# Patient Record
Sex: MALE | Race: WHITE | NOT HISPANIC OR LATINO | Employment: FULL TIME | URBAN - METROPOLITAN AREA
[De-identification: names, ages, dates, MRNs, and addresses within clinical notes are randomized per-mention and may not be internally consistent; named-entity substitution may affect disease eponyms.]

---

## 2017-05-04 PROBLEM — R60.0 BILATERAL EDEMA OF LOWER EXTREMITY: Status: ACTIVE | Noted: 2017-05-04

## 2018-02-07 PROBLEM — R60.0 BILATERAL EDEMA OF LOWER EXTREMITY: Status: RESOLVED | Noted: 2017-05-04 | Resolved: 2018-02-07

## 2018-07-31 PROBLEM — R73.9 HEMOGLOBIN A1C BETWEEN 7.0% AND 9.0%: Status: ACTIVE | Noted: 2018-07-31

## 2018-07-31 PROBLEM — F41.9 ANXIETY AND DEPRESSION: Status: ACTIVE | Noted: 2018-07-31

## 2018-07-31 PROBLEM — F32.A ANXIETY AND DEPRESSION: Status: ACTIVE | Noted: 2018-07-31

## 2019-07-08 ENCOUNTER — HOSPITAL ENCOUNTER (OUTPATIENT)
Dept: RADIOLOGY | Facility: HOSPITAL | Age: 59
Discharge: HOME | End: 2019-07-08
Attending: INTERNAL MEDICINE
Payer: COMMERCIAL

## 2019-07-08 DIAGNOSIS — M25.562 ACUTE PAIN OF LEFT KNEE: ICD-10-CM

## 2019-07-08 DIAGNOSIS — M79.605 LEFT LEG PAIN: ICD-10-CM

## 2019-07-08 DIAGNOSIS — E78.5 HYPERLIPIDEMIA WITH TARGET LOW DENSITY LIPOPROTEIN (LDL) CHOLESTEROL LESS THAN 70 MG/DL: ICD-10-CM

## 2019-07-08 DIAGNOSIS — I10 ESSENTIAL HYPERTENSION: ICD-10-CM

## 2019-07-08 DIAGNOSIS — E10.65 TYPE 1 DIABETES MELLITUS WITH HYPERGLYCEMIA: ICD-10-CM

## 2019-07-08 DIAGNOSIS — R60.0 LOCALIZED EDEMA: ICD-10-CM

## 2019-07-08 DIAGNOSIS — Z79.899 ENCOUNTER FOR LONG-TERM (CURRENT) USE OF MEDICATIONS: ICD-10-CM

## 2019-07-08 DIAGNOSIS — M79.10 MYALGIA: ICD-10-CM

## 2019-07-08 PROCEDURE — 93971 EXTREMITY STUDY: CPT | Mod: 26,LT,, | Performed by: RADIOLOGY

## 2019-07-08 PROCEDURE — 93971 US LOWER EXTREMITY VEINS LEFT: ICD-10-PCS | Mod: 26,LT,, | Performed by: RADIOLOGY

## 2019-07-08 PROCEDURE — 93971 EXTREMITY STUDY: CPT | Mod: TC,PN,LT

## 2019-12-06 ENCOUNTER — LAB VISIT (OUTPATIENT)
Dept: LAB | Facility: HOSPITAL | Age: 59
End: 2019-12-06
Attending: NURSE PRACTITIONER
Payer: COMMERCIAL

## 2019-12-06 ENCOUNTER — HOSPITAL ENCOUNTER (EMERGENCY)
Facility: HOSPITAL | Age: 59
Discharge: HOME | End: 2019-12-06
Attending: EMERGENCY MEDICINE
Payer: COMMERCIAL

## 2019-12-06 VITALS
OXYGEN SATURATION: 94 % | TEMPERATURE: 98 F | WEIGHT: 250 LBS | BODY MASS INDEX: 33.13 KG/M2 | HEART RATE: 86 BPM | HEIGHT: 73 IN | DIASTOLIC BLOOD PRESSURE: 66 MMHG | SYSTOLIC BLOOD PRESSURE: 116 MMHG | RESPIRATION RATE: 20 BRPM

## 2019-12-06 DIAGNOSIS — R07.1 PAINFUL BREATHING: ICD-10-CM

## 2019-12-06 DIAGNOSIS — R50.9 FEVER, UNSPECIFIED FEVER CAUSE: ICD-10-CM

## 2019-12-06 DIAGNOSIS — R06.02 SOB (SHORTNESS OF BREATH): ICD-10-CM

## 2019-12-06 DIAGNOSIS — R06.00 DYSPNEA: ICD-10-CM

## 2019-12-06 DIAGNOSIS — J06.9 UPPER RESPIRATORY TRACT INFECTION, UNSPECIFIED TYPE: ICD-10-CM

## 2019-12-06 LAB
ALBUMIN SERPL BCP-MCNC: 4.2 G/DL (ref 3.5–5.2)
ALP SERPL-CCNC: 81 U/L (ref 55–135)
ALT SERPL W/O P-5'-P-CCNC: 25 U/L (ref 10–44)
ANION GAP SERPL CALC-SCNC: 14 MMOL/L (ref 8–16)
APTT BLDCRRT: 23.8 SEC (ref 21–32)
AST SERPL-CCNC: 14 U/L (ref 10–40)
BASOPHILS # BLD AUTO: 0.06 K/UL (ref 0–0.2)
BASOPHILS NFR BLD: 0.5 % (ref 0–1.9)
BILIRUB SERPL-MCNC: 0.8 MG/DL (ref 0.1–1)
BNP SERPL-MCNC: 16 PG/ML (ref 0–99)
BUN SERPL-MCNC: 17 MG/DL (ref 6–20)
CALCIUM SERPL-MCNC: 8.8 MG/DL (ref 8.7–10.5)
CHLORIDE SERPL-SCNC: 96 MMOL/L (ref 95–110)
CO2 SERPL-SCNC: 20 MMOL/L (ref 23–29)
CREAT SERPL-MCNC: 1.3 MG/DL (ref 0.5–1.4)
D DIMER PPP FEU-MCNC: <100 NG/ML (ref 100–400)
DIFFERENTIAL METHOD: ABNORMAL
EOSINOPHIL # BLD AUTO: 0.1 K/UL (ref 0–0.5)
EOSINOPHIL NFR BLD: 1 % (ref 0–8)
ERYTHROCYTE [DISTWIDTH] IN BLOOD BY AUTOMATED COUNT: 12.1 % (ref 11.5–14.5)
EST. GFR  (AFRICAN AMERICAN): >60 ML/MIN/1.73 M^2
EST. GFR  (NON AFRICAN AMERICAN): >60 ML/MIN/1.73 M^2
GLUCOSE SERPL-MCNC: 384 MG/DL (ref 70–110)
HCT VFR BLD AUTO: 46.2 % (ref 40–54)
HGB BLD-MCNC: 15.8 G/DL (ref 14–18)
IMM GRANULOCYTES # BLD AUTO: 0.06 K/UL (ref 0–0.04)
IMM GRANULOCYTES NFR BLD AUTO: 0.5 % (ref 0–0.5)
INFLUENZA A, MOLECULAR: NEGATIVE
INFLUENZA B, MOLECULAR: NEGATIVE
INR PPP: 1.1 (ref 0.8–1.2)
LYMPHOCYTES # BLD AUTO: 2.8 K/UL (ref 1–4.8)
LYMPHOCYTES NFR BLD: 22.1 % (ref 18–48)
MCH RBC QN AUTO: 30.7 PG (ref 27–31)
MCHC RBC AUTO-ENTMCNC: 34.2 G/DL (ref 32–36)
MCV RBC AUTO: 90 FL (ref 82–98)
MONOCYTES # BLD AUTO: 0.8 K/UL (ref 0.3–1)
MONOCYTES NFR BLD: 6 % (ref 4–15)
NEUTROPHILS # BLD AUTO: 8.8 K/UL (ref 1.8–7.7)
NEUTROPHILS NFR BLD: 69.9 % (ref 38–73)
NRBC BLD-RTO: 0 /100 WBC
PLATELET # BLD AUTO: 280 K/UL (ref 150–350)
PMV BLD AUTO: 10 FL (ref 9.2–12.9)
POTASSIUM SERPL-SCNC: 3.9 MMOL/L (ref 3.5–5.1)
PROT SERPL-MCNC: 7.4 G/DL (ref 6–8.4)
PROTHROMBIN TIME: 11.4 SEC (ref 9–12.5)
RBC # BLD AUTO: 5.15 M/UL (ref 4.6–6.2)
SODIUM SERPL-SCNC: 130 MMOL/L (ref 136–145)
SPECIMEN SOURCE: NORMAL
TROPONIN I SERPL DL<=0.01 NG/ML-MCNC: <0.01 NG/ML (ref 0.02–0.5)
WBC # BLD AUTO: 12.58 K/UL (ref 3.9–12.7)

## 2019-12-06 PROCEDURE — 80053 COMPREHEN METABOLIC PANEL: CPT

## 2019-12-06 PROCEDURE — 36415 COLL VENOUS BLD VENIPUNCTURE: CPT

## 2019-12-06 PROCEDURE — 71045 X-RAY EXAM CHEST 1 VIEW: CPT | Mod: 26,,, | Performed by: RADIOLOGY

## 2019-12-06 PROCEDURE — 87502 INFLUENZA DNA AMP PROBE: CPT

## 2019-12-06 PROCEDURE — 85610 PROTHROMBIN TIME: CPT

## 2019-12-06 PROCEDURE — 99285 EMERGENCY DEPT VISIT HI MDM: CPT | Mod: 25

## 2019-12-06 PROCEDURE — 85730 THROMBOPLASTIN TIME PARTIAL: CPT

## 2019-12-06 PROCEDURE — 93005 ELECTROCARDIOGRAM TRACING: CPT

## 2019-12-06 PROCEDURE — 83880 ASSAY OF NATRIURETIC PEPTIDE: CPT

## 2019-12-06 PROCEDURE — 71045 X-RAY EXAM CHEST 1 VIEW: CPT | Mod: TC,FY

## 2019-12-06 PROCEDURE — 85379 FIBRIN DEGRADATION QUANT: CPT

## 2019-12-06 PROCEDURE — 85025 COMPLETE CBC W/AUTO DIFF WBC: CPT

## 2019-12-06 PROCEDURE — 84484 ASSAY OF TROPONIN QUANT: CPT

## 2019-12-06 PROCEDURE — 71045 XR CHEST AP PORTABLE: ICD-10-PCS | Mod: 26,,, | Performed by: RADIOLOGY

## 2019-12-06 RX ORDER — PREDNISONE 20 MG/1
20 TABLET ORAL DAILY
Qty: 5 TABLET | Refills: 0 | Status: SHIPPED | OUTPATIENT
Start: 2019-12-06 | End: 2019-12-11

## 2019-12-06 NOTE — ED TRIAGE NOTES
Patient complaining of cough and congestion x2 weeks, [place], given Z-pack, steroid ingection and Rocephin injection. Went to his MD today who did labs and has a CT chest scheduled for 1430 today, told him to come to the ER.

## 2019-12-06 NOTE — ED NOTES
The patient is walked to radiology department. I talked to radiology tech and let them know that the patient has an 18 gauge IV for CT scan.

## 2019-12-09 NOTE — ED PROVIDER NOTES
Encounter Date: 12/6/2019       History     Chief Complaint   Patient presents with    Cough     Patient complaining of cough and congestion x2 weeks, seen at urgent care twice out of state, given Z-pack, steroid ingection and Rocephin injection. Went to his MD today who did labs and has a CT chest scheduled for 1430 today, told him to come to the ER.    Nasal Congestion     58yo male with pmh chronic pain, DM, HTN, and HLD presents to ED for evaluation of intermittent, non-productive cough, pleurisy, and nasal congestion over the last two weeks. He was seen at local pcp with labs and outpatient CT ordered for 1420 today; however, he felt worse and was advised to come to the ED. Denies fever, chills.     Additionally, he was seen at urgent care twice out of state, given Z-pack, steroid ingection and Rocephin injection.         Review of patient's allergies indicates:   Allergen Reactions    Demerol [meperidine] Nausea And Vomiting     Past Medical History:   Diagnosis Date    Chronic pain     DM (diabetes mellitus)     HTN (hypertension)     Hyperlipemia      Past Surgical History:   Procedure Laterality Date    c-spine  2013    CERVICAL SPINE SURGERY  2013    COLONOSCOPY  2005    [place]    LUMBAR SPINE SURGERY  2012    SPINE SURGERY      Cervical     Family History   Problem Relation Age of Onset    Diabetes Father     Cancer Mother         colon    Heart failure Mother      Social History     Tobacco Use    Smoking status: Former Smoker     Packs/day: 1.00     Types: Cigarettes   Substance Use Topics    Alcohol use: No    Drug use: No     Review of Systems   Constitutional: Negative for appetite change, chills, diaphoresis, fatigue and fever.   HENT: Positive for congestion. Negative for ear pain, rhinorrhea, sinus pressure, sinus pain, sore throat and tinnitus.    Eyes: Negative for photophobia and visual disturbance.   Respiratory: Positive for cough and shortness of breath. Negative for  chest tightness and wheezing.    Cardiovascular: Positive for chest pain. Negative for palpitations and leg swelling.   Gastrointestinal: Negative for abdominal pain, constipation, diarrhea, nausea and vomiting.   Endocrine: Negative for cold intolerance, heat intolerance, polydipsia, polyphagia and polyuria.   Genitourinary: Negative for decreased urine volume, difficulty urinating, dysuria, flank pain, frequency, hematuria and urgency.   Musculoskeletal: Negative for arthralgias, back pain, gait problem, joint swelling, myalgias, neck pain and neck stiffness.   Skin: Negative for color change, pallor, rash and wound.   Allergic/Immunologic: Negative for immunocompromised state.   Neurological: Negative for dizziness, syncope, weakness, light-headedness, numbness and headaches.   Hematological: Negative for adenopathy. Does not bruise/bleed easily.   Psychiatric/Behavioral: Negative for decreased concentration, dysphoric mood and sleep disturbance. The patient is not nervous/anxious.    All other systems reviewed and are negative.      Physical Exam     Initial Vitals [12/06/19 1226]   BP Pulse Resp Temp SpO2   109/74 97 20 97.8 °F (36.6 °C) 100 %      MAP       --         Physical Exam    Nursing note and vitals reviewed.  Constitutional: He appears well-developed and well-nourished. He is not diaphoretic. No distress.   HENT:   Head: Normocephalic and atraumatic.   Right Ear: External ear normal.   Left Ear: External ear normal.   Nose: Nose normal.   Mouth/Throat: Oropharynx is clear and moist.   Eyes: Conjunctivae are normal. Pupils are equal, round, and reactive to light. No scleral icterus.   Neck: Normal range of motion. Neck supple. No JVD present.   Cardiovascular: Normal rate, regular rhythm, normal heart sounds and intact distal pulses.   Pulmonary/Chest: Breath sounds normal. No respiratory distress. He has no wheezes. He has no rhonchi. He has no rales. He exhibits no tenderness.   Abdominal: Soft.  Bowel sounds are normal. He exhibits no distension. There is no tenderness. There is no rebound and no guarding.   Musculoskeletal: Normal range of motion. He exhibits no edema or tenderness.   Lymphadenopathy:     He has no cervical adenopathy.   Neurological: He is alert and oriented to person, place, and time. GCS score is 15. GCS eye subscore is 4. GCS verbal subscore is 5. GCS motor subscore is 6.   Skin: Skin is warm and dry. Capillary refill takes less than 2 seconds. No rash and no abscess noted. No erythema. No pallor.   Psychiatric: He has a normal mood and affect. His behavior is normal. Judgment and thought content normal.         ED Course   Procedures  Labs Reviewed   TROPONIN I - Abnormal; Notable for the following components:       Result Value    Troponin I <0.01 (*)     All other components within normal limits   INFLUENZA A & B BY MOLECULAR   APTT   PROTIME-INR   D DIMER, QUANTITATIVE   B-TYPE NATRIURETIC PEPTIDE     EKG Readings: (Independently Interpreted)   Initial Reading: No STEMI. Rhythm: Normal Sinus Rhythm. Heart Rate: 87. Ectopy: No Ectopy. Conduction: Normal. ST Segments: Normal ST Segments. T Waves: Normal. Axis: Normal. Clinical Impression: Normal Sinus Rhythm     ECG Results          EKG 12-lead (Final result)  Result time 12/07/19 09:10:49    Final result by [facility] (12/07/19 09:10:49)                 Narrative:    Test Reason : R05    Vent. Rate : 087 BPM     Atrial Rate : 087 BPM     P-R Int : 166 ms          QRS Dur : 088 ms      QT Int : 350 ms       P-R-T Axes : 061 025 064 degrees     QTc Int : 421 ms    Normal sinus rhythm  Normal ECG  No previous ECGs available  Confirmed by Quincy Damon MD ([id number]) on 12/7/2019 9:10:38 AM    Referred By: [email or web address]   SELF           Confirmed By:Quincy Damon MD                            Imaging Results          X-Ray Chest AP Portable (Final result)  Result time 12/06/19 13:14:26    Final result by Jose Guadalupe Arguello  MD (12/06/19 13:14:26)                 Impression:      No acute abnormality.      Electronically signed by: Jose Guadalupe Jos  Date:    12/06/2019  Time:    13:14             Narrative:    EXAMINATION:  XR CHEST AP PORTABLE    CLINICAL HISTORY:  dyspnea;.    TECHNIQUE:  Single frontal portable view of the chest was performed.    COMPARISON:  09/30/2017.    FINDINGS:  Support devices: None    The lungs are clear, with normal appearance of pulmonary vasculature and no pleural effusion or pneumothorax.    The cardiac silhouette is normal in size. The hilar and mediastinal contours are unremarkable.    Bones are intact.  Prior ACDF of the lower cervical spine.                              X-Rays:   Independently Interpreted Readings:   Chest X-Ray: Normal heart size.  No infiltrates.  No acute abnormalities.     Medical Decision Making:   Differential Diagnosis:   Pneumonia, acute bronchitis, pleurisy, acute myocardial infarction, acute congestive heart failure, electrolyte imbalance, influenza, acute pulmonary embolus  ED Management:  Discussed all results with the patient, who is comfortable returning home.   He has been advised of negative d-dimer value but instructed to follow up with pcp regarding previously ordered CTPE study.   Advised of return precautions, instructed to follow up closely.                                 Clinical Impression:       ICD-10-CM ICD-9-CM   1. Dyspnea R06.00 786.09         Disposition:   Disposition: Discharged  Condition: Stable                     Shari Darnell MD  12/09/19 0926

## 2020-01-29 ENCOUNTER — HOSPITAL ENCOUNTER (OUTPATIENT)
Dept: RADIOLOGY | Facility: HOSPITAL | Age: 60
Discharge: HOME | End: 2020-01-29
Attending: NURSE PRACTITIONER
Payer: COMMERCIAL

## 2020-01-29 DIAGNOSIS — I73.9 CLAUDICATION: ICD-10-CM

## 2020-01-29 DIAGNOSIS — G62.9 NEUROPATHY: ICD-10-CM

## 2020-01-29 PROCEDURE — 93925 US ARTERIAL LOWER EXTREMITY BILAT WITH ABI (XPD): ICD-10-PCS | Mod: 26,,, | Performed by: RADIOLOGY

## 2020-01-29 PROCEDURE — 93922 UPR/L XTREMITY ART 2 LEVELS: CPT | Mod: 26,,, | Performed by: RADIOLOGY

## 2020-01-29 PROCEDURE — 93922 US ARTERIAL LOWER EXTREMITY BILAT WITH ABI (XPD): ICD-10-PCS | Mod: 26,,, | Performed by: RADIOLOGY

## 2020-01-29 PROCEDURE — 93925 LOWER EXTREMITY STUDY: CPT | Mod: 26,,, | Performed by: RADIOLOGY

## 2020-01-29 PROCEDURE — 93922 UPR/L XTREMITY ART 2 LEVELS: CPT | Mod: TC

## 2020-02-17 ENCOUNTER — HOSPITAL ENCOUNTER (OUTPATIENT)
Facility: HOSPITAL | Age: 60
Discharge: HOME | End: 2020-02-19
Attending: EMERGENCY MEDICINE | Admitting: INTERNAL MEDICINE
Payer: COMMERCIAL

## 2020-02-17 DIAGNOSIS — F32.A ANXIETY AND DEPRESSION: ICD-10-CM

## 2020-02-17 DIAGNOSIS — E78.5 HYPERLIPIDEMIA WITH TARGET LOW DENSITY LIPOPROTEIN (LDL) CHOLESTEROL LESS THAN 70 MG/DL: ICD-10-CM

## 2020-02-17 DIAGNOSIS — R73.9 HEMOGLOBIN A1C BETWEEN 7.0% AND 9.0%: ICD-10-CM

## 2020-02-17 DIAGNOSIS — F41.9 ANXIETY AND DEPRESSION: ICD-10-CM

## 2020-02-17 DIAGNOSIS — Z79.899 ENCOUNTER FOR LONG-TERM (CURRENT) USE OF MEDICATIONS: ICD-10-CM

## 2020-02-17 DIAGNOSIS — R07.9 CHEST PAIN: ICD-10-CM

## 2020-02-17 DIAGNOSIS — I20.0 UNSTABLE ANGINA: Primary | ICD-10-CM

## 2020-02-17 DIAGNOSIS — E10.65 TYPE 1 DIABETES MELLITUS WITH HYPERGLYCEMIA: ICD-10-CM

## 2020-02-17 LAB
ALBUMIN SERPL BCP-MCNC: 4.1 G/DL (ref 3.5–5.2)
ALP SERPL-CCNC: 64 U/L (ref 55–135)
ALT SERPL W/O P-5'-P-CCNC: 21 U/L (ref 10–44)
ANION GAP SERPL CALC-SCNC: 11 MMOL/L (ref 8–16)
AST SERPL-CCNC: 14 U/L (ref 10–40)
BASOPHILS # BLD AUTO: 0.07 K/UL (ref 0–0.2)
BASOPHILS NFR BLD: 0.6 % (ref 0–1.9)
BILIRUB SERPL-MCNC: 0.7 MG/DL (ref 0.1–1)
BILIRUB UR QL STRIP: NEGATIVE
BNP SERPL-MCNC: 17 PG/ML (ref 0–99)
BUN SERPL-MCNC: 12 MG/DL (ref 6–20)
CALCIUM SERPL-MCNC: 8.8 MG/DL (ref 8.7–10.5)
CHLORIDE SERPL-SCNC: 100 MMOL/L (ref 95–110)
CLARITY UR: CLEAR
CO2 SERPL-SCNC: 24 MMOL/L (ref 23–29)
COLOR UR: YELLOW
CREAT SERPL-MCNC: 1.1 MG/DL (ref 0.5–1.4)
DIFFERENTIAL METHOD: ABNORMAL
EOSINOPHIL # BLD AUTO: 0.3 K/UL (ref 0–0.5)
EOSINOPHIL NFR BLD: 2.1 % (ref 0–8)
ERYTHROCYTE [DISTWIDTH] IN BLOOD BY AUTOMATED COUNT: 12.4 % (ref 11.5–14.5)
EST. GFR  (AFRICAN AMERICAN): >60 ML/MIN/1.73 M^2
EST. GFR  (NON AFRICAN AMERICAN): >60 ML/MIN/1.73 M^2
ESTIMATED AVG GLUCOSE: 200 MG/DL (ref 68–131)
GLUCOSE SERPL-MCNC: 216 MG/DL (ref 70–110)
GLUCOSE UR QL STRIP: ABNORMAL
HBA1C MFR BLD HPLC: 8.6 % (ref 4.5–6.2)
HCT VFR BLD AUTO: 43.8 % (ref 40–54)
HGB BLD-MCNC: 15.1 G/DL (ref 14–18)
HGB UR QL STRIP: NEGATIVE
IMM GRANULOCYTES # BLD AUTO: 0.04 K/UL (ref 0–0.04)
IMM GRANULOCYTES NFR BLD AUTO: 0.3 % (ref 0–0.5)
INFLUENZA A, MOLECULAR: NEGATIVE
INFLUENZA B, MOLECULAR: NEGATIVE
KETONES UR QL STRIP: NEGATIVE
LEUKOCYTE ESTERASE UR QL STRIP: NEGATIVE
LYMPHOCYTES # BLD AUTO: 3.3 K/UL (ref 1–4.8)
LYMPHOCYTES NFR BLD: 26.8 % (ref 18–48)
MCH RBC QN AUTO: 31.1 PG (ref 27–31)
MCHC RBC AUTO-ENTMCNC: 34.5 G/DL (ref 32–36)
MCV RBC AUTO: 90 FL (ref 82–98)
MONOCYTES # BLD AUTO: 0.9 K/UL (ref 0.3–1)
MONOCYTES NFR BLD: 7.1 % (ref 4–15)
NEUTROPHILS # BLD AUTO: 7.7 K/UL (ref 1.8–7.7)
NEUTROPHILS NFR BLD: 63.1 % (ref 38–73)
NITRITE UR QL STRIP: NEGATIVE
NRBC BLD-RTO: 0 /100 WBC
PH UR STRIP: 6 [PH] (ref 5–8)
PLATELET # BLD AUTO: 280 K/UL (ref 150–350)
PMV BLD AUTO: 9.9 FL (ref 9.2–12.9)
POCT GLUCOSE: 103 MG/DL (ref 70–110)
POCT GLUCOSE: 205 MG/DL (ref 70–110)
POCT GLUCOSE: 45 MG/DL (ref 70–110)
POCT GLUCOSE: 52 MG/DL (ref 70–110)
POTASSIUM SERPL-SCNC: 3.1 MMOL/L (ref 3.5–5.1)
PROT SERPL-MCNC: 7.4 G/DL (ref 6–8.4)
PROT UR QL STRIP: NEGATIVE
RBC # BLD AUTO: 4.85 M/UL (ref 4.6–6.2)
SODIUM SERPL-SCNC: 135 MMOL/L (ref 136–145)
SP GR UR STRIP: <=1.005 (ref 1–1.03)
SPECIMEN SOURCE: NORMAL
TROPONIN I SERPL DL<=0.01 NG/ML-MCNC: 0.01 NG/ML (ref 0.02–0.5)
TROPONIN I SERPL DL<=0.01 NG/ML-MCNC: <0.01 NG/ML (ref 0.02–0.5)
URN SPEC COLLECT METH UR: ABNORMAL
UROBILINOGEN UR STRIP-ACNC: NEGATIVE EU/DL
WBC # BLD AUTO: 12.22 K/UL (ref 3.9–12.7)

## 2020-02-17 PROCEDURE — 71046 XR CHEST PA AND LATERAL: ICD-10-PCS | Mod: 26,,, | Performed by: RADIOLOGY

## 2020-02-17 PROCEDURE — 82962 GLUCOSE BLOOD TEST: CPT

## 2020-02-17 PROCEDURE — 85025 COMPLETE CBC W/AUTO DIFF WBC: CPT

## 2020-02-17 PROCEDURE — 83880 ASSAY OF NATRIURETIC PEPTIDE: CPT

## 2020-02-17 PROCEDURE — 25000003 PHARM REV CODE 250: Performed by: EMERGENCY MEDICINE

## 2020-02-17 PROCEDURE — 71046 X-RAY EXAM CHEST 2 VIEWS: CPT | Mod: 26,,, | Performed by: RADIOLOGY

## 2020-02-17 PROCEDURE — G0378 HOSPITAL OBSERVATION PER HR: HCPCS

## 2020-02-17 PROCEDURE — 80053 COMPREHEN METABOLIC PANEL: CPT

## 2020-02-17 PROCEDURE — 36415 COLL VENOUS BLD VENIPUNCTURE: CPT

## 2020-02-17 PROCEDURE — 87502 INFLUENZA DNA AMP PROBE: CPT

## 2020-02-17 PROCEDURE — 99285 EMERGENCY DEPT VISIT HI MDM: CPT | Mod: 25

## 2020-02-17 PROCEDURE — 71046 X-RAY EXAM CHEST 2 VIEWS: CPT | Mod: TC,FY

## 2020-02-17 PROCEDURE — 81003 URINALYSIS AUTO W/O SCOPE: CPT

## 2020-02-17 PROCEDURE — 84484 ASSAY OF TROPONIN QUANT: CPT | Mod: 91

## 2020-02-17 PROCEDURE — 83036 HEMOGLOBIN GLYCOSYLATED A1C: CPT

## 2020-02-17 RX ORDER — SODIUM CHLORIDE 0.9 % (FLUSH) 0.9 %
10 SYRINGE (ML) INJECTION
Status: DISCONTINUED | OUTPATIENT
Start: 2020-02-17 | End: 2020-02-19 | Stop reason: HOSPADM

## 2020-02-17 RX ORDER — ONDANSETRON 2 MG/ML
4 INJECTION INTRAMUSCULAR; INTRAVENOUS EVERY 8 HOURS PRN
Status: DISCONTINUED | OUTPATIENT
Start: 2020-02-17 | End: 2020-02-19 | Stop reason: HOSPADM

## 2020-02-17 RX ORDER — HYDROCODONE BITARTRATE AND ACETAMINOPHEN 5; 325 MG/1; MG/1
1 TABLET ORAL EVERY 4 HOURS PRN
Status: DISCONTINUED | OUTPATIENT
Start: 2020-02-17 | End: 2020-02-19 | Stop reason: HOSPADM

## 2020-02-17 RX ORDER — ASPIRIN 325 MG
325 TABLET ORAL
Status: COMPLETED | OUTPATIENT
Start: 2020-02-17 | End: 2020-02-17

## 2020-02-17 RX ADMIN — ASPIRIN 325 MG ORAL TABLET 325 MG: 325 PILL ORAL at 07:02

## 2020-02-17 RX ADMIN — HYDROCODONE BITARTRATE AND ACETAMINOPHEN 1 TABLET: 5; 325 TABLET ORAL at 09:02

## 2020-02-17 NOTE — ED TRIAGE NOTES
"amb to er with c/o "generalized weakness" states " I get very tired  Easily. Pt states this has been going on for approx 2 weeks now   "

## 2020-02-17 NOTE — ED PROVIDER NOTES
Encounter Date: 2020       History     Chief Complaint   Patient presents with    general malaise      59-year-old male with past medical history of hypertension, diabetes, obesity comes emergency room with complaints of generalized malaise getting worse over the last week to 2 weeks.  The patient reports that with any type of walking or exertion because extremely winded, nauseated, pale, and has pain that radiates down his left arm to his left neck to his left cheek.  States that upon resting this gets better however today the pain took markedly longer to resolve.  Patient came to the ER for further evaluation.  Patient did have a heart catheterization in  which was negative at that time.  Mother still alive with extensive cardiovascular heart disease in 2 open heart surgeries.  Father  from old age at age 80.        Review of patient's allergies indicates:   Allergen Reactions    Demerol [meperidine] Nausea And Vomiting     Past Medical History:   Diagnosis Date    Anxiety     Chronic pain     Depression     Diabetes mellitus type I     DM (diabetes mellitus)     HTN (hypertension)     Hyperlipemia     Obesity      Past Surgical History:   Procedure Laterality Date    c-spine  2013    CERVICAL SPINE SURGERY  2013    [place]OSCOPY  2005    [place]    LUMBAR SPINE SURGERY  2012    SPINE SURGERY      Cervical     Family History   Problem Relation Age of Onset    Diabetes Father     Cancer Mother         colon    Heart failure Mother      Social History     Tobacco Use    Smoking status: Current Some Day Smoker     Packs/day: 1.00     Types: Cigarettes    Smokeless tobacco: Never Used   Substance Use Topics    Alcohol use: No    Drug use: No     Review of Systems   Constitutional: Negative.    HENT: Negative.    Respiratory: Positive for shortness of breath.    Cardiovascular: Positive for chest pain.   Gastrointestinal: Negative.    Musculoskeletal:        Left arm pain that hurts  with exertion and resolves with rest.   All other systems reviewed and are negative.      Physical Exam     Initial Vitals [02/17/20 1522]   BP Pulse Resp Temp SpO2   (!) 146/78 92 18 97.4 °F (36.3 °C) 100 %      MAP       --         Physical Exam    Nursing note and vitals reviewed.  Constitutional: He appears well-developed and well-nourished.   HENT:   Head: Normocephalic and atraumatic.   Eyes: EOM are normal. Pupils are equal, round, and reactive to light.   Neck: Normal range of motion.   Cardiovascular: Normal rate, regular rhythm and normal heart sounds.   Pulmonary/Chest: Breath sounds normal.   Abdominal: Soft. Bowel sounds are normal.   Neurological: He is alert and oriented to person, place, and time. He has normal strength. GCS score is 15. GCS eye subscore is 4. GCS verbal subscore is 5. GCS motor subscore is 6.   Skin: Skin is warm. Capillary refill takes less than 2 seconds.   Psychiatric: He has a normal mood and affect. Thought content normal.         ED Course   Procedures  Labs Reviewed   CBC W/ AUTO DIFFERENTIAL - Abnormal; Notable for the following components:       Result Value    Mean Corpuscular Hemoglobin 31.1 (*)     All other components within normal limits   COMPREHENSIVE METABOLIC PANEL - Abnormal; Notable for the following components:    Sodium 135 (*)     Potassium 3.1 (*)     Glucose 216 (*)     All other components within normal limits   TROPONIN I - Abnormal; Notable for the following components:    Troponin I 0.01 (*)     All other components within normal limits   POCT GLUCOSE - Abnormal; Notable for the following components:    POCT Glucose 205 (*)     All other components within normal limits   INFLUENZA A & B BY MOLECULAR     EKG Readings: (Independently Interpreted)   Rate 88, normal sinus rhythm, normal axis, normal QRS, normal R-wave progression, normal EKG.       Imaging Results    None          Medical Decision Making:   Differential Diagnosis:   MI, STEMI, anxiety  reaction, costochondritis, PE, pneumothorax, angina, cancer, fracture    ED Management:  The patient is stable this time.  I discussed with the patient the likelihood that this could be cardiac in origin.  I will admit this patient to hospital.  I spoke with Dr. Brownlee.  He understands and agrees with this plan.  I will also order a stress test with echo in the morning.                                 Clinical Impression:       ICD-10-CM ICD-9-CM   1. Unstable angina I20.0 411.1   2. Chest pain R07.9 786.50         Disposition:   Disposition: Admitted  Condition: Nidia Duarte MD  02/17/20 7538

## 2020-02-18 PROBLEM — R07.89 OTHER CHEST PAIN: Status: ACTIVE | Noted: 2020-02-18

## 2020-02-18 LAB
ALBUMIN SERPL BCP-MCNC: 3.4 G/DL (ref 3.5–5.2)
ALP SERPL-CCNC: 57 U/L (ref 55–135)
ALT SERPL W/O P-5'-P-CCNC: 20 U/L (ref 10–44)
ANION GAP SERPL CALC-SCNC: 6 MMOL/L (ref 8–16)
AST SERPL-CCNC: 14 U/L (ref 10–40)
BASOPHILS # BLD AUTO: 0.06 K/UL (ref 0–0.2)
BASOPHILS NFR BLD: 0.9 % (ref 0–1.9)
BILIRUB SERPL-MCNC: 0.6 MG/DL (ref 0.1–1)
BUN SERPL-MCNC: 10 MG/DL (ref 6–20)
CALCIUM SERPL-MCNC: 8.9 MG/DL (ref 8.7–10.5)
CHLORIDE SERPL-SCNC: 105 MMOL/L (ref 95–110)
CO2 SERPL-SCNC: 28 MMOL/L (ref 23–29)
CREAT SERPL-MCNC: 0.9 MG/DL (ref 0.5–1.4)
DIFFERENTIAL METHOD: NORMAL
EOSINOPHIL # BLD AUTO: 0.2 K/UL (ref 0–0.5)
EOSINOPHIL NFR BLD: 3 % (ref 0–8)
ERYTHROCYTE [DISTWIDTH] IN BLOOD BY AUTOMATED COUNT: 12.6 % (ref 11.5–14.5)
EST. GFR  (AFRICAN AMERICAN): >60 ML/MIN/1.73 M^2
EST. GFR  (NON AFRICAN AMERICAN): >60 ML/MIN/1.73 M^2
GLUCOSE SERPL-MCNC: 229 MG/DL (ref 70–110)
HCT VFR BLD AUTO: 41.7 % (ref 40–54)
HGB BLD-MCNC: 14.4 G/DL (ref 14–18)
IMM GRANULOCYTES # BLD AUTO: 0.02 K/UL (ref 0–0.04)
IMM GRANULOCYTES NFR BLD AUTO: 0.3 % (ref 0–0.5)
LYMPHOCYTES # BLD AUTO: 2.5 K/UL (ref 1–4.8)
LYMPHOCYTES NFR BLD: 35.3 % (ref 18–48)
MCH RBC QN AUTO: 30.8 PG (ref 27–31)
MCHC RBC AUTO-ENTMCNC: 34.5 G/DL (ref 32–36)
MCV RBC AUTO: 89 FL (ref 82–98)
MONOCYTES # BLD AUTO: 0.6 K/UL (ref 0.3–1)
MONOCYTES NFR BLD: 8 % (ref 4–15)
NEUTROPHILS # BLD AUTO: 3.7 K/UL (ref 1.8–7.7)
NEUTROPHILS NFR BLD: 52.5 % (ref 38–73)
NRBC BLD-RTO: 0 /100 WBC
PLATELET # BLD AUTO: 256 K/UL (ref 150–350)
PMV BLD AUTO: 10 FL (ref 9.2–12.9)
POCT GLUCOSE: 116 MG/DL (ref 70–110)
POCT GLUCOSE: 215 MG/DL (ref 70–110)
POCT GLUCOSE: 243 MG/DL (ref 70–110)
POCT GLUCOSE: 264 MG/DL (ref 70–110)
POCT GLUCOSE: 73 MG/DL (ref 70–110)
POTASSIUM SERPL-SCNC: 3.7 MMOL/L (ref 3.5–5.1)
PROT SERPL-MCNC: 6.5 G/DL (ref 6–8.4)
RBC # BLD AUTO: 4.68 M/UL (ref 4.6–6.2)
SODIUM SERPL-SCNC: 139 MMOL/L (ref 136–145)
TROPONIN I SERPL DL<=0.01 NG/ML-MCNC: <0.01 NG/ML (ref 0.02–0.5)
WBC # BLD AUTO: 7 K/UL (ref 3.9–12.7)

## 2020-02-18 PROCEDURE — 96372 THER/PROPH/DIAG INJ SC/IM: CPT | Performed by: EMERGENCY MEDICINE

## 2020-02-18 PROCEDURE — 80053 COMPREHEN METABOLIC PANEL: CPT

## 2020-02-18 PROCEDURE — G0378 HOSPITAL OBSERVATION PER HR: HCPCS

## 2020-02-18 PROCEDURE — 25000003 PHARM REV CODE 250

## 2020-02-18 PROCEDURE — 63600175 PHARM REV CODE 636 W HCPCS: Performed by: INTERNAL MEDICINE

## 2020-02-18 PROCEDURE — 85025 COMPLETE CBC W/AUTO DIFF WBC: CPT

## 2020-02-18 PROCEDURE — 25000003 PHARM REV CODE 250: Performed by: INTERNAL MEDICINE

## 2020-02-18 PROCEDURE — 25000003 PHARM REV CODE 250: Performed by: EMERGENCY MEDICINE

## 2020-02-18 PROCEDURE — C9399 UNCLASSIFIED DRUGS OR BIOLOG: HCPCS | Performed by: INTERNAL MEDICINE

## 2020-02-18 PROCEDURE — 84484 ASSAY OF TROPONIN QUANT: CPT

## 2020-02-18 PROCEDURE — 63600175 PHARM REV CODE 636 W HCPCS

## 2020-02-18 RX ORDER — ASPIRIN 325 MG
TABLET ORAL
Status: COMPLETED
Start: 2020-02-18 | End: 2020-02-18

## 2020-02-18 RX ORDER — DULOXETIN HYDROCHLORIDE 30 MG/1
60 CAPSULE, DELAYED RELEASE ORAL DAILY
Status: DISCONTINUED | OUTPATIENT
Start: 2020-02-18 | End: 2020-02-19 | Stop reason: HOSPADM

## 2020-02-18 RX ORDER — ATORVASTATIN CALCIUM 10 MG/1
20 TABLET, FILM COATED ORAL DAILY
Status: DISCONTINUED | OUTPATIENT
Start: 2020-02-18 | End: 2020-02-19 | Stop reason: HOSPADM

## 2020-02-18 RX ORDER — PREGABALIN 100 MG/1
100 CAPSULE ORAL 3 TIMES DAILY
Status: DISCONTINUED | OUTPATIENT
Start: 2020-02-18 | End: 2020-02-19 | Stop reason: HOSPADM

## 2020-02-18 RX ORDER — INSULIN ASPART 100 [IU]/ML
INJECTION, SOLUTION INTRAVENOUS; SUBCUTANEOUS
Status: COMPLETED
Start: 2020-02-18 | End: 2020-02-18

## 2020-02-18 RX ORDER — ASPIRIN 325 MG
325 TABLET ORAL DAILY
Status: DISCONTINUED | OUTPATIENT
Start: 2020-02-18 | End: 2020-02-19 | Stop reason: HOSPADM

## 2020-02-18 RX ORDER — PANTOPRAZOLE SODIUM 40 MG/1
40 TABLET, DELAYED RELEASE ORAL DAILY
Status: DISCONTINUED | OUTPATIENT
Start: 2020-02-18 | End: 2020-02-19 | Stop reason: HOSPADM

## 2020-02-18 RX ORDER — ASPIRIN 325 MG
325 TABLET, DELAYED RELEASE (ENTERIC COATED) ORAL DAILY
Status: DISCONTINUED | OUTPATIENT
Start: 2020-02-18 | End: 2020-02-18

## 2020-02-18 RX ADMIN — ASPIRIN 325 MG ORAL TABLET 325 MG: 325 PILL ORAL at 12:02

## 2020-02-18 RX ADMIN — HYDROCODONE BITARTRATE AND ACETAMINOPHEN 1 TABLET: 5; 325 TABLET ORAL at 09:02

## 2020-02-18 RX ADMIN — PANTOPRAZOLE SODIUM 40 MG: 40 TABLET, DELAYED RELEASE ORAL at 12:02

## 2020-02-18 RX ADMIN — PREGABALIN 100 MG: 100 CAPSULE ORAL at 02:02

## 2020-02-18 RX ADMIN — INSULIN DETEMIR 30 UNITS: 100 INJECTION, SOLUTION SUBCUTANEOUS at 12:02

## 2020-02-18 RX ADMIN — HYDROCODONE BITARTRATE AND ACETAMINOPHEN 1 TABLET: 5; 325 TABLET ORAL at 10:02

## 2020-02-18 RX ADMIN — ATORVASTATIN CALCIUM 20 MG: 10 TABLET, FILM COATED ORAL at 12:02

## 2020-02-18 RX ADMIN — INSULIN ASPART 5 UNITS: 100 INJECTION, SOLUTION INTRAVENOUS; SUBCUTANEOUS at 09:02

## 2020-02-18 RX ADMIN — NITROGLYCERIN 0.5 INCH: 20 OINTMENT TOPICAL at 05:02

## 2020-02-18 RX ADMIN — HYDROCODONE BITARTRATE AND ACETAMINOPHEN 1 TABLET: 5; 325 TABLET ORAL at 05:02

## 2020-02-18 RX ADMIN — PREGABALIN 100 MG: 100 CAPSULE ORAL at 09:02

## 2020-02-18 RX ADMIN — DULOXETINE 60 MG: 30 CAPSULE, DELAYED RELEASE ORAL at 12:02

## 2020-02-18 NOTE — SUBJECTIVE & OBJECTIVE
"Past Medical History:   Diagnosis Date    Anxiety     Chronic pain     Depression     Diabetes mellitus type I     DM (diabetes mellitus)     HTN (hypertension)     Hyperlipemia     Obesity        Past Surgical History:   Procedure Laterality Date    c-spine  2013    CERVICAL SPINE SURGERY  2013    COLONOSCOPY  2005    [place]    LUMBAR SPINE SURGERY  2012    SPINE SURGERY      Cervical       Review of patient's allergies indicates:   Allergen Reactions    Demerol [meperidine] Nausea And Vomiting       No current facility-administered medications on file prior to encounter.      Current Outpatient Medications on File Prior to Encounter   Medication Sig    aspirin (ECOTRIN) 325 MG EC tablet Take 325 mg by mouth once daily.    albuterol (VENTOLIN HFA) 90 mcg/actuation inhaler Inhale 2 puffs into the lungs every 6 (six) hours as needed for Wheezing. Rescue    albuterol-ipratropium (DUO-NEB) 2.5 mg-0.5 mg/3 mL nebulizer solution Take 3 mLs by nebulization every 4 to 6 hours as needed for Wheezing.    atorvastatin (LIPITOR) 20 MG tablet TAKE 1 TABLET BY MOUTH ONCE DAILY    blood sugar diagnostic (ACCU-CHEK ACTIVE TEST) Strp 1 strip by Misc.(Non-Drug; Combo Route) route 5 (five) times daily.    blood-glucose meter kit Use as instructed    DULoxetine (CYMBALTA) 60 MG capsule Take 1 capsule (60 mg total) by mouth once daily.    insulin glargine U-300 conc (TOUJEO MAX U-300 SOLOSTAR) 300 unit/mL (3 mL) InPn Inject 66 Units into the skin once daily.    insulin lispro (HUMALOG KWIKPEN INSULIN) 100 unit/mL pen Inject 15 Units into the skin 3 (three) times daily with meals. Sliding scale max 30 units daily    insulin needles, disposable, 30 x 1/3 " Ndle 1 each by Misc.(Non-Drug; Combo Route) route as directed.    insulin syringe-needle U-100 (BD INSULIN SYRINGE ULTRA-FINE) 1/2 mL 30 gauge x 1/2" Syrg Inject 1 each into the skin 4 (four) times daily.    lisinopril-hydrochlorothiazide " "(PRINZIDE,ZESTORETIC) 20-12.5 mg per tablet Take 2 tablets by mouth once daily.    omeprazole (PRILOSEC) 40 MG capsule Take 1 capsule (40 mg total) by mouth once daily.    pen needle, diabetic (NOVOFINE PLUS) 32 gauge x 1/6" Ndle Inject 1 each into the skin once daily.    pregabalin (LYRICA) 100 MG capsule Take 1 capsule (100 mg total) by mouth 3 (three) times daily.     Family History     Problem Relation (Age of Onset)    Cancer Mother    Diabetes Father    Heart failure Mother        Tobacco Use    Smoking status: Current Some Day Smoker     Packs/day: 1.00     Types: Cigarettes    Smokeless tobacco: Never Used   Substance and Sexual Activity    Alcohol use: No    Drug use: No    Sexual activity: Yes     Review of Systems   Constitutional: Negative.    HENT: Negative.    Respiratory: Positive for shortness of breath.    Cardiovascular: Positive for chest pain.   All other systems reviewed and are negative.    Objective:     Vital Signs (Most Recent):  Temp: 97.9 °F (36.6 °C) (02/18/20 0800)  Pulse: 70 (02/18/20 0800)  Resp: (!) 7 (02/18/20 0800)  BP: 121/74 (02/18/20 0800)  SpO2: (!) 93 % (02/18/20 0800) Vital Signs (24h Range):  Temp:  [97.4 °F (36.3 °C)-98.3 °F (36.8 °C)] 97.9 °F (36.6 °C)  Pulse:  [70-92] 70  Resp:  [7-18] 7  SpO2:  [92 %-100 %] 93 %  BP: (105-146)/(57-85) 121/74     Weight: 113.8 kg (250 lb 14.1 oz)  Body mass index is 33.1 kg/m².    Physical Exam   Constitutional: He is oriented to person, place, and time. Vital signs are normal. He appears well-developed and well-nourished. He is active.   HENT:   Head: Normocephalic and atraumatic.   Eyes: Pupils are equal, round, and reactive to light. Conjunctivae are normal.   Neck: Normal range of motion. Neck supple. No thyromegaly present.   Cardiovascular: Normal rate, regular rhythm and normal heart sounds.   Pulmonary/Chest: Effort normal and breath sounds normal.   Abdominal: Soft. Normal appearance and bowel sounds are normal. There is " no tenderness.   Musculoskeletal: Normal range of motion.   Neurological: He is alert and oriented to person, place, and time.   Skin: Skin is warm and dry.   Psychiatric: He has a normal mood and affect. His behavior is normal. Judgment and thought content normal.   Nursing note and vitals reviewed.        CRANIAL NERVES     CN III, IV, VI   Pupils are equal, round, and reactive to light.       Significant Labs: All pertinent labs within the past 24 hours have been reviewed.    Significant Imaging: I have reviewed and interpreted all pertinent imaging results/findings within the past 24 hours.

## 2020-02-18 NOTE — H&P
[facility] - ICU Hospital Medicine  History & Physical    Patient Name: Billy Sears  MRN: [id number]  Admission Date: 2/17/2020  Attending Physician: Andrew Brownlee III, MD  Primary Care Provider: Andrew Brownlee III, MD         Patient information was obtained from patient and ER records.     Subjective:     Principal Problem:Other chest pain    Chief Complaint:   Chief Complaint   Patient presents with    general malaise         HPI: 2.17  Admitted with cp ;and varela    Past Medical History:   Diagnosis Date    Anxiety     Chronic pain     Depression     Diabetes mellitus type I     DM (diabetes mellitus)     HTN (hypertension)     Hyperlipemia     Obesity        Past Surgical History:   Procedure Laterality Date    c-spine  2013    CERVICAL SPINE SURGERY  2013    COLONOSCOPY  2005    [place]    LUMBAR SPINE SURGERY  2012    SPINE SURGERY      Cervical       Review of patient's allergies indicates:   Allergen Reactions    Demerol [meperidine] Nausea And Vomiting       No current facility-administered medications on file prior to encounter.      Current Outpatient Medications on File Prior to Encounter   Medication Sig    aspirin (ECOTRIN) 325 MG EC tablet Take 325 mg by mouth once daily.    albuterol (VENTOLIN HFA) 90 mcg/actuation inhaler Inhale 2 puffs into the lungs every 6 (six) hours as needed for Wheezing. Rescue    albuterol-ipratropium (DUO-NEB) 2.5 mg-0.5 mg/3 mL nebulizer solution Take 3 mLs by nebulization every 4 to 6 hours as needed for Wheezing.    atorvastatin (LIPITOR) 20 MG tablet TAKE 1 TABLET BY MOUTH ONCE DAILY    blood sugar diagnostic (ACCU-CHEK ACTIVE TEST) Strp 1 strip by Misc.(Non-Drug; Combo Route) route 5 (five) times daily.    blood-glucose meter kit Use as instructed    DULoxetine (CYMBALTA) 60 MG capsule Take 1 capsule (60 mg total) by mouth once daily.    insulin glargine U-300 conc (TOUJEO MAX U-300 SOLOSTAR) 300 unit/mL (3 mL) InPn  "Inject 66 Units into the skin once daily.    insulin lispro (HUMALOG KWIKPEN INSULIN) 100 unit/mL pen Inject 15 Units into the skin 3 (three) times daily with meals. Sliding scale max 30 units daily    insulin needles, disposable, 30 x 1/3 " Ndle 1 each by Misc.(Non-Drug; Combo Route) route as directed.    insulin syringe-needle U-100 (BD INSULIN SYRINGE ULTRA-FINE) 1/2 mL 30 gauge x 1/2" Syrg Inject 1 each into the skin 4 (four) times daily.    lisinopril-hydrochlorothiazide (PRINZIDE,ZESTORETIC) 20-12.5 mg per tablet Take 2 tablets by mouth once daily.    omeprazole (PRILOSEC) 40 MG capsule Take 1 capsule (40 mg total) by mouth once daily.    pen needle, diabetic (NOVOFINE PLUS) 32 gauge x 1/6" Ndle Inject 1 each into the skin once daily.    pregabalin (LYRICA) 100 MG capsule Take 1 capsule (100 mg total) by mouth 3 (three) times daily.     Family History     Problem Relation (Age of Onset)    Cancer Mother    Diabetes Father    Heart failure Mother        Tobacco Use    Smoking status: Current Some Day Smoker     Packs/day: 1.00     Types: Cigarettes    Smokeless tobacco: Never Used   Substance and Sexual Activity    Alcohol use: No    Drug use: No    Sexual activity: Yes     Review of Systems   Constitutional: Negative.    HENT: Negative.    Respiratory: Positive for shortness of breath.    Cardiovascular: Positive for chest pain.   All other systems reviewed and are negative.    Objective:     Vital Signs (Most Recent):  Temp: 97.9 °F (36.6 °C) (02/18/20 0800)  Pulse: 70 (02/18/20 0800)  Resp: (!) 7 (02/18/20 0800)  BP: 121/74 (02/18/20 0800)  SpO2: (!) 93 % (02/18/20 0800) Vital Signs (24h Range):  Temp:  [97.4 °F (36.3 °C)-98.3 °F (36.8 °C)] 97.9 °F (36.6 °C)  Pulse:  [70-92] 70  Resp:  [7-18] 7  SpO2:  [92 %-100 %] 93 %  BP: (105-146)/(57-85) 121/74     Weight: 113.8 kg (250 lb 14.1 oz)  Body mass index is 33.1 kg/m².    Physical Exam   Constitutional: He is oriented to person, place, and time. " Vital signs are normal. He appears well-developed and well-nourished. He is active.   HENT:   Head: Normocephalic and atraumatic.   Eyes: Pupils are equal, round, and reactive to light. Conjunctivae are normal.   Neck: Normal range of motion. Neck supple. No thyromegaly present.   Cardiovascular: Normal rate, regular rhythm and normal heart sounds.   Pulmonary/Chest: Effort normal and breath sounds normal.   Abdominal: Soft. Normal appearance and bowel sounds are normal. There is no tenderness.   Musculoskeletal: Normal range of motion.   Neurological: He is alert and oriented to person, place, and time.   Skin: Skin is warm and dry.   Psychiatric: He has a normal mood and affect. His behavior is normal. Judgment and thought content normal.   Nursing note and vitals reviewed.        CRANIAL NERVES     CN III, IV, VI   Pupils are equal, round, and reactive to light.       Significant Labs: All pertinent labs within the past 24 hours have been reviewed.    Significant Imaging: I have reviewed and interpreted all pertinent imaging results/findings within the past 24 hours.    Assessment/Plan:     * Other chest pain    2.17  Admit  R/o mi  Stress test      VTE Risk Mitigation (From admission, onward)         Ordered     IP VTE HIGH RISK PATIENT  Once      02/17/20 1847     Place MICHELLE hose  Until discontinued      02/17/20 1847                   Andrew Brownlee III, MD  Department of Hospital Medicine   [facility] - ICU

## 2020-02-18 NOTE — SUBJECTIVE & OBJECTIVE
"Interval History: still c/o chest "tightness"    Review of Systems   Constitutional: Negative.    HENT: Negative.    Cardiovascular: Positive for chest pain.   All other systems reviewed and are negative.    Objective:     Vital Signs (Most Recent):  Temp: 97.9 °F (36.6 °C) (02/18/20 0800)  Pulse: 70 (02/18/20 0800)  Resp: (!) 7 (02/18/20 0800)  BP: 121/74 (02/18/20 0800)  SpO2: (!) 93 % (02/18/20 0800) Vital Signs (24h Range):  Temp:  [97.4 °F (36.3 °C)-98.3 °F (36.8 °C)] 97.9 °F (36.6 °C)  Pulse:  [70-92] 70  Resp:  [7-18] 7  SpO2:  [92 %-100 %] 93 %  BP: (105-146)/(57-85) 121/74     Weight: 113.8 kg (250 lb 14.1 oz)  Body mass index is 33.1 kg/m².    Intake/Output Summary (Last 24 hours) at 2/18/2020 1253  Last data filed at 2/18/2020 0405  Gross per 24 hour   Intake 324 ml   Output 1875 ml   Net -1551 ml      Physical Exam   Constitutional: He is oriented to person, place, and time. Vital signs are normal. He appears well-developed and well-nourished. He is active.   HENT:   Head: Normocephalic and atraumatic.   Eyes: Pupils are equal, round, and reactive to light. Conjunctivae are normal.   Neck: Normal range of motion. Neck supple. No thyromegaly present.   Cardiovascular: Normal rate, regular rhythm and normal heart sounds.   Pulmonary/Chest: Effort normal and breath sounds normal.   Abdominal: Soft. Normal appearance and bowel sounds are normal. There is no tenderness.   Musculoskeletal: Normal range of motion.   Neurological: He is alert and oriented to person, place, and time.   Skin: Skin is warm and dry.   Psychiatric: He has a normal mood and affect. His behavior is normal. Judgment and thought content normal.   Nursing note and vitals reviewed.      Significant Labs:   Recent Lab Results       02/18/20  1211   02/18/20  0430   02/18/20  0115   02/18/20  0053   02/18/20  0007        Influenza A, Molecular               Influenza B, Molecular               Albumin   3.4           Alkaline Phosphatase   " 57           ALT   20           Anion Gap   6           Appearance, UA               AST   14           Baso #   0.06           Basophil%   0.9           Bilirubin (UA)               BILIRUBIN TOTAL   0.6  Comment:  For infants and newborns, interpretation of results should be based  on gestational age, weight and in agreement with clinical  observations.  Premature Infant recommended reference ranges:  Up to 24 hours.............<8.0 mg/dL  Up to 48 hours............<12.0 mg/dL  3-5 days..................<15.0 mg/dL  6-29 days.................<15.0 mg/dL             BNP               BUN, Bld   10           Calcium   8.9           Chloride   105           CO2   28           Color, UA               Creatinine   0.9           Differential Method   Automated           eGFR if    >60.0           eGFR if non    >60.0  Comment:  Calculation used to obtain the estimated glomerular filtration  rate (eGFR) is the CKD-EPI equation.              Eos #   0.2           Eosinophil%   3.0           Estimated Avg Glucose               Flu A & B Source               Glucose   229           Glucose, UA               Gran # (ANC)   3.7           Gran%   52.5           Hematocrit   41.7           Hemoglobin   14.4           Hemoglobin A1C External               Immature Grans (Abs)   0.02  Comment:  Mild elevation in immature granulocytes is non specific and   can be seen in a variety of conditions including stress response,   acute inflammation, trauma and pregnancy. Correlation with other   laboratory and clinical findings is essential.             Immature Granulocytes   0.3           Ketones, UA               Leukocytes, UA               Lymph #   2.5           Lymph%   35.3           MCH   30.8           MCHC   34.5           MCV   89           Mono #   0.6           Mono%   8.0           MPV   10.0           NITRITE UA               nRBC   0           Occult Blood UA               pH, UA                Platelets   256           POCT Glucose 215   116   73     Potassium   3.7           PROTEIN TOTAL   6.5           Protein, UA               RBC   4.68           RDW   12.6           Sodium   139           Specific [place], UA               Specimen UA               Troponin I       <0.01       UROBILINOGEN UA               WBC   7.00                            02/17/20  2350   02/17/20  2335   02/17/20  2119   02/17/20  1904   02/17/20  1631        Influenza A, Molecular               Influenza B, Molecular               Albumin               Alkaline Phosphatase               ALT               Anion Gap               Appearance, UA         Clear     AST               Baso #               Basophil%               Bilirubin (UA)         Negative     BILIRUBIN TOTAL               BNP               BUN, Bld               Calcium               Chloride               CO2               Color, UA         Yellow     Creatinine               Differential Method               eGFR if                eGFR if non                Eos #               Eosinophil%               Estimated Avg Glucose       200       Flu A & B Source               Glucose               Glucose, UA         1+     Gran # (ANC)               Gran%               Hematocrit               Hemoglobin               Hemoglobin A1C External       8.6  Comment:  According to ADA guidelines, hemoglobin A1C <7.0% represents  optimal control in non-pregnant diabetic patients.  Different  metrics may apply to specific populations.   Standards of Medical Care in Diabetes - 2016.  For the purpose of screening for the presence of diabetes:  <5.7%     Consistent with the absence of diabetes  5.7-6.4%  Consistent with increasing risk for diabetes   (prediabetes)  >or=6.5%  Consistent with diabetes  Currently no consensus exists for use of hemoglobin A1C  for diagnosis of diabetes for children.         Immature Grans (Abs)                Immature Granulocytes               Ketones, UA         Negative     Leukocytes, UA         Negative     Lymph #               Lymph%               MCH               MCHC               MCV               Mono #               Mono%               MPV               NITRITE UA         Negative     nRBC               Occult Blood UA         Negative     pH, UA         6.0     Platelets               POCT Glucose 45 52 103         Potassium               PROTEIN TOTAL               Protein, UA         Negative  Comment:  Recommend a 24 hour urine protein or a urine   protein/creatinine ratio if globulin induced proteinuria is  clinically suspected.       RBC               RDW               Sodium               Specific Gravity, UA         <=1.005     Specimen UA         Urine, Clean Catch     Troponin I       <0.01       UROBILINOGEN UA         Negative     WBC                                02/17/20  1541   02/17/20  1524        Influenza A, Molecular Negative       Influenza B, Molecular Negative       Albumin 4.1       Alkaline Phosphatase 64       ALT 21       Anion Gap 11       Appearance, UA         AST 14       Baso # 0.07       Basophil% 0.6       Bilirubin (UA)         BILIRUBIN TOTAL 0.7  Comment:  For infants and newborns, interpretation of results should be based  on gestational age, weight and in agreement with clinical  observations.  Premature Infant recommended reference ranges:  Up to 24 hours.............<8.0 mg/dL  Up to 48 hours............<12.0 mg/dL  3-5 days..................<15.0 mg/dL  6-29 days.................<15.0 mg/dL         BNP 17  Comment:  Values of less than 100 pg/ml are consistent with non-CHF populations.       BUN, Bld 12       Calcium 8.8       Chloride 100       CO2 24       Color, UA         Creatinine 1.1       Differential Method Automated       eGFR if  >60.0       eGFR if non  >60.0  Comment:  Calculation used to obtain the estimated glomerular  filtration  rate (eGFR) is the CKD-EPI equation.          Eos # 0.3       Eosinophil% 2.1       Estimated Avg Glucose         Flu A & B Source Nasal Swab       Glucose 216       Glucose, UA         Gran # (ANC) 7.7       Gran% 63.1       Hematocrit 43.8       Hemoglobin 15.1       Hemoglobin A1C External         Immature Grans (Abs) 0.04  Comment:  Mild elevation in immature granulocytes is non specific and   can be seen in a variety of conditions including stress response,   acute inflammation, trauma and pregnancy. Correlation with other   laboratory and clinical findings is essential.         Immature Granulocytes 0.3       Ketones, UA         Leukocytes, UA         Lymph # 3.3       Lymph% 26.8       MCH 31.1       MCHC 34.5       MCV 90       Mono # 0.9       Mono% 7.1       MPV 9.9       NITRITE UA         nRBC 0       Occult Blood UA         pH, UA         Platelets 280       POCT Glucose   205     Potassium 3.1       PROTEIN TOTAL 7.4       Protein, UA         RBC 4.85       RDW 12.4       Sodium 135       Specific [place], UA         Specimen UA         Troponin I 0.01       UROBILINOGEN UA         WBC 12.22             Significant Imaging: I have reviewed and interpreted all pertinent imaging results/findings within the past 24 hours.

## 2020-02-18 NOTE — NURSING
On assessment, pt. Resting well in bed, with no acute events overnight, no chest pain noted. Vitals wnl, call bell within reach. Will continue to monitor.

## 2020-02-18 NOTE — PROGRESS NOTES
"[facility] - ICU Hospital Medicine  Progress Note    Patient Name: Billy Sears  MRN: [id number]  Patient Class: OP- Observation   Admission Date: 2/17/2020  Length of Stay: 0 days  Attending Physician: Andrew Brownlee III, MD  Primary Care Provider: Andrew Brownlee III, MD        Subjective:     Principal Problem:Other chest pain        HPI:  2.17  Admitted with cp ;and varela    Overview/Hospital Course:  No notes on file    Interval History: still c/o chest "tightness"    Review of Systems   Constitutional: Negative.    HENT: Negative.    Cardiovascular: Positive for chest pain.   All other systems reviewed and are negative.    Objective:     Vital Signs (Most Recent):  Temp: 97.9 °F (36.6 °C) (02/18/20 0800)  Pulse: 70 (02/18/20 0800)  Resp: (!) 7 (02/18/20 0800)  BP: 121/74 (02/18/20 0800)  SpO2: (!) 93 % (02/18/20 0800) Vital Signs (24h Range):  Temp:  [97.4 °F (36.3 °C)-98.3 °F (36.8 °C)] 97.9 °F (36.6 °C)  Pulse:  [70-92] 70  Resp:  [7-18] 7  SpO2:  [92 %-100 %] 93 %  BP: (105-146)/(57-85) 121/74     Weight: 113.8 kg (250 lb 14.1 oz)  Body mass index is 33.1 kg/m².    Intake/Output Summary (Last 24 hours) at 2/18/2020 1253  Last data filed at 2/18/2020 0405  Gross per 24 hour   Intake 324 ml   Output 1875 ml   Net -1551 ml      Physical Exam   Constitutional: He is oriented to person, place, and time. Vital signs are normal. He appears well-developed and well-nourished. He is active.   HENT:   Head: Normocephalic and atraumatic.   Eyes: Pupils are equal, round, and reactive to light. Conjunctivae are normal.   Neck: Normal range of motion. Neck supple. No thyromegaly present.   Cardiovascular: Normal rate, regular rhythm and normal heart sounds.   Pulmonary/Chest: Effort normal and breath sounds normal.   Abdominal: Soft. Normal appearance and bowel sounds are normal. There is no tenderness.   Musculoskeletal: Normal range of motion.   Neurological: He is alert and oriented to person, place, " and time.   Skin: Skin is warm and dry.   Psychiatric: He has a normal mood and affect. His behavior is normal. Judgment and thought content normal.   Nursing note and vitals reviewed.      Significant Labs:   Recent Lab Results       02/18/20  1211   02/18/20  0430   02/18/20  0115   02/18/20  0053   02/18/20  0007        Influenza A, Molecular               Influenza B, Molecular               Albumin   3.4           Alkaline Phosphatase   57           ALT   20           Anion Gap   6           Appearance, UA               AST   14           Baso #   0.06           Basophil%   0.9           Bilirubin (UA)               BILIRUBIN TOTAL   0.6  Comment:  For infants and newborns, interpretation of results should be based  on gestational age, weight and in agreement with clinical  observations.  Premature Infant recommended reference ranges:  Up to 24 hours.............<8.0 mg/dL  Up to 48 hours............<12.0 mg/dL  3-5 days..................<15.0 mg/dL  6-29 days.................<15.0 mg/dL             BNP               BUN, Bld   10           Calcium   8.9           Chloride   105           CO2   28           Color, UA               Creatinine   0.9           Differential Method   Automated           eGFR if    >60.0           eGFR if non    >60.0  Comment:  Calculation used to obtain the estimated glomerular filtration  rate (eGFR) is the CKD-EPI equation.              Eos #   0.2           Eosinophil%   3.0           Estimated Avg Glucose               Flu A & B Source               Glucose   229           Glucose, UA               Gran # (ANC)   3.7           Gran%   52.5           Hematocrit   41.7           Hemoglobin   14.4           Hemoglobin A1C External               Immature Grans (Abs)   0.02  Comment:  Mild elevation in immature granulocytes is non specific and   can be seen in a variety of conditions including stress response,   acute inflammation, trauma and  pregnancy. Correlation with other   laboratory and clinical findings is essential.             Immature Granulocytes   0.3           Ketones, UA               Leukocytes, UA               Lymph #   2.5           Lymph%   35.3           MCH   30.8           MCHC   34.5           MCV   89           Mono #   0.6           Mono%   8.0           MPV   10.0           NITRITE UA               nRBC   0           Occult Blood UA               pH, UA               Platelets   256           POCT Glucose 215   116   73     Potassium   3.7           PROTEIN TOTAL   6.5           Protein, UA               RBC   4.68           RDW   12.6           Sodium   139           Specific [place], UA               Specimen UA               Troponin I       <0.01       UROBILINOGEN UA               WBC   7.00                            02/17/20  2350   02/17/20  2335   02/17/20  2119   02/17/20  1904   02/17/20  1631        Influenza A, Molecular               Influenza B, Molecular               Albumin               Alkaline Phosphatase               ALT               Anion Gap               Appearance, UA         Clear     AST               Baso #               Basophil%               Bilirubin (UA)         Negative     BILIRUBIN TOTAL               BNP               BUN, Bld               Calcium               Chloride               CO2               Color, UA         Yellow     Creatinine               Differential Method               eGFR if                eGFR if non                Eos #               Eosinophil%               Estimated Avg Glucose       200       Flu A & B Source               Glucose               Glucose, UA         1+     Gran # (ANC)               Gran%               Hematocrit               Hemoglobin               Hemoglobin A1C External       8.6  Comment:  According to ADA guidelines, hemoglobin A1C <7.0% represents  optimal control in non-pregnant diabetic patients.   Different  metrics may apply to specific populations.   Standards of Medical Care in Diabetes - 2016.  For the purpose of screening for the presence of diabetes:  <5.7%     Consistent with the absence of diabetes  5.7-6.4%  Consistent with increasing risk for diabetes   (prediabetes)  >or=6.5%  Consistent with diabetes  Currently no consensus exists for use of hemoglobin A1C  for diagnosis of diabetes for children.         Immature Grans (Abs)               Immature Granulocytes               Ketones, UA         Negative     Leukocytes, UA         Negative     Lymph #               Lymph%               MCH               MCHC               MCV               Mono #               Mono%               MPV               NITRITE UA         Negative     nRBC               Occult Blood UA         Negative     pH, UA         6.0     Platelets               POCT Glucose 45 52 103         Potassium               PROTEIN TOTAL               Protein, UA         Negative  Comment:  Recommend a 24 hour urine protein or a urine   protein/creatinine ratio if globulin induced proteinuria is  clinically suspected.       RBC               RDW               Sodium               Specific Gravity, UA         <=1.005     Specimen UA         Urine, Clean Catch     Troponin I       <0.01       UROBILINOGEN UA         Negative     WBC                                02/17/20  1541   02/17/20  1524        Influenza A, Molecular Negative       Influenza B, Molecular Negative       Albumin 4.1       Alkaline Phosphatase 64       ALT 21       Anion Gap 11       Appearance, UA         AST 14       Baso # 0.07       Basophil% 0.6       Bilirubin (UA)         BILIRUBIN TOTAL 0.7  Comment:  For infants and newborns, interpretation of results should be based  on gestational age, weight and in agreement with clinical  observations.  Premature Infant recommended reference ranges:  Up to 24 hours.............<8.0 mg/dL  Up to 48 hours............<12.0  mg/dL  3-5 days..................<15.0 mg/dL  6-29 days.................<15.0 mg/dL         BNP 17  Comment:  Values of less than 100 pg/ml are consistent with non-CHF populations.       BUN, Bld 12       Calcium 8.8       Chloride 100       CO2 24       Color, UA         Creatinine 1.1       Differential Method Automated       eGFR if  >60.0       eGFR if non  >60.0  Comment:  Calculation used to obtain the estimated glomerular filtration  rate (eGFR) is the CKD-EPI equation.          Eos # 0.3       Eosinophil% 2.1       Estimated Avg Glucose         Flu A & B Source Nasal Swab       Glucose 216       Glucose, UA         Gran # (ANC) 7.7       Gran% 63.1       Hematocrit 43.8       Hemoglobin 15.1       Hemoglobin A1C External         Immature Grans (Abs) 0.04  Comment:  Mild elevation in immature granulocytes is non specific and   can be seen in a variety of conditions including stress response,   acute inflammation, trauma and pregnancy. Correlation with other   laboratory and clinical findings is essential.         Immature Granulocytes 0.3       Ketones, UA         Leukocytes, UA         Lymph # 3.3       Lymph% 26.8       MCH 31.1       MCHC 34.5       MCV 90       Mono # 0.9       Mono% 7.1       MPV 9.9       NITRITE UA         nRBC 0       Occult Blood UA         pH, UA         Platelets 280       POCT Glucose   205     Potassium 3.1       PROTEIN TOTAL 7.4       Protein, UA         RBC 4.85       RDW 12.4       Sodium 135       Specific [place], UA         Specimen UA         Troponin I 0.01       UROBILINOGEN UA         WBC 12.22             Significant Imaging: I have reviewed and interpreted all pertinent imaging results/findings within the past 24 hours.      Assessment/Plan:      * Other chest pain    2.17  Admit  R/o mi  Stress test    2.18  Still having chest tightness  Multiple risk factors for cad  Apparently unable to perform stress test today  Will attempt  tomorrow  Cardiology consult      VTE Risk Mitigation (From admission, onward)         Ordered     IP VTE HIGH RISK PATIENT  Once      02/17/20 1847     Place MICHELLE hose  Until discontinued      02/17/20 1847                      Andrwe Brownlee III, MD  Department of Hospital Medicine   [facility]

## 2020-02-18 NOTE — NURSING
Repeat glucose noted to be in the 40s. Food and milk administered. Subsequent repeat glucose in the 70s. Will continue to monitor.

## 2020-02-18 NOTE — PLAN OF CARE
02/18/20 0925   Discharge Assessment   Assessment Type Discharge Planning Assessment   Confirmed/corrected address and phone number on facesheet? Yes   Assessment information obtained from? Patient   Expected Length of Stay (days) 2   Communicated expected length of stay with patient/caregiver yes   Prior to hospitilization cognitive status: Alert/Oriented   Prior to hospitalization functional status: Independent   Current cognitive status: Alert/Oriented   Current Functional Status: Independent   Lives With sibling(s)   Able to Return to Prior Arrangements yes   Is patient able to care for self after discharge? Yes   Who are your caregiver(s) and their phone number(s)? none   Patient's perception of discharge disposition home or selfcare   Readmission Within the Last 30 Days no previous admission in last 30 days   Patient currently being followed by outpatient case management? No   Patient currently receives any other outside agency services? No   Equipment Currently Used at Home nebulizer   Do you have any problems affording any of your prescribed medications? No   Is the patient taking medications as prescribed? yes   Does the patient have transportation home? Yes   Transportation Anticipated car, drives self;family or friend will provide   Does the patient receive services at the [facility]? No   Discharge Plan A Home   DME Needed Upon Discharge  none   Patient/Family in Agreement with Plan yes       Patient resting in bed with hob elevated, alert and oriented.  States he lives in [place], [place] but only uses Dr Brownlee as his physician.  Patient denies use of home health services.  States the only medical equipment at home is a nebulizer.  Denies any needs at this time.  Case Management will continue to follow for further needs.

## 2020-02-18 NOTE — NURSING
Pt requests CBG to be checked. Glucose noted to be in the 50s. Orange juice administered. Recheck in 10-15 minutes.

## 2020-02-18 NOTE — ASSESSMENT & PLAN NOTE
2.17  Admit  R/o mi  Stress test    2.18  Still having chest tightness  Multiple risk factors for cad  Apparently unable to perform stress test today  Will attempt tomorrow  Cardiology consult

## 2020-02-18 NOTE — PLAN OF CARE
Problem: Adult Inpatient Plan of Care  Goal: Plan of Care Review  Outcome: Ongoing, Progressing  Flowsheets (Taken 2/18/2020 0141)  Plan of Care Reviewed With: patient     Problem: Diabetes Comorbidity  Goal: Blood Glucose Level Within Desired Range  Outcome: Ongoing, Progressing  Intervention: Maintain Glycemic Control  Flowsheets (Taken 2/18/2020 0141)  Glycemic Management: blood glucose monitoring; carbohydrate replacement provided

## 2020-02-19 VITALS
HEIGHT: 73 IN | OXYGEN SATURATION: 94 % | RESPIRATION RATE: 12 BRPM | WEIGHT: 250 LBS | HEART RATE: 80 BPM | DIASTOLIC BLOOD PRESSURE: 58 MMHG | SYSTOLIC BLOOD PRESSURE: 119 MMHG | BODY MASS INDEX: 33.13 KG/M2 | TEMPERATURE: 98 F

## 2020-02-19 PROBLEM — E66.09 CLASS 1 OBESITY DUE TO EXCESS CALORIES WITH SERIOUS COMORBIDITY AND BODY MASS INDEX (BMI) OF 33.0 TO 33.9 IN ADULT: Status: ACTIVE | Noted: 2020-02-19

## 2020-02-19 PROBLEM — F17.200 SMOKER: Status: ACTIVE | Noted: 2020-02-19

## 2020-02-19 PROBLEM — E65 ABDOMINAL OBESITY: Status: ACTIVE | Noted: 2020-02-19

## 2020-02-19 PROBLEM — Z82.49 FAMILY HISTORY OF PREMATURE CAD: Status: ACTIVE | Noted: 2020-02-19

## 2020-02-19 PROBLEM — T50.2X5A DIURETIC-INDUCED HYPOKALEMIA: Status: ACTIVE | Noted: 2020-02-19

## 2020-02-19 PROBLEM — Z91.89 CARDIOVASCULAR EVENT RISK: Status: ACTIVE | Noted: 2020-02-19

## 2020-02-19 PROBLEM — E87.6 DIURETIC-INDUCED HYPOKALEMIA: Status: ACTIVE | Noted: 2020-02-19

## 2020-02-19 LAB
ALBUMIN SERPL BCP-MCNC: 3.6 G/DL (ref 3.5–5.2)
ALP SERPL-CCNC: 61 U/L (ref 55–135)
ALT SERPL W/O P-5'-P-CCNC: 23 U/L (ref 10–44)
ANION GAP SERPL CALC-SCNC: 7 MMOL/L (ref 8–16)
AORTIC ROOT ANNULUS: 3.57 CM
AORTIC VALVE CUSP SEPERATION: 2.06 CM
AST SERPL-CCNC: 12 U/L (ref 10–40)
BASOPHILS # BLD AUTO: 0.05 K/UL (ref 0–0.2)
BASOPHILS NFR BLD: 0.8 % (ref 0–1.9)
BILIRUB SERPL-MCNC: 0.4 MG/DL (ref 0.1–1)
BSA FOR ECHO PROCEDURE: 2.42 M2
BUN SERPL-MCNC: 13 MG/DL (ref 6–20)
CALCIUM SERPL-MCNC: 9.1 MG/DL (ref 8.7–10.5)
CHLORIDE SERPL-SCNC: 104 MMOL/L (ref 95–110)
CO2 SERPL-SCNC: 29 MMOL/L (ref 23–29)
CREAT SERPL-MCNC: 1 MG/DL (ref 0.5–1.4)
CV ECHO LV RWT: 0.53 CM
CV STRESS BASE HR: 87 BPM
DIASTOLIC BLOOD PRESSURE: 80 MMHG
DIFFERENTIAL METHOD: NORMAL
DOP CALC LVOT AREA: 4.8 CM2
DOP CALC LVOT DIAMETER: 2.48 CM
ECHO LV POSTERIOR WALL: 1.13 CM (ref 0.6–1.1)
EOSINOPHIL # BLD AUTO: 0.2 K/UL (ref 0–0.5)
EOSINOPHIL NFR BLD: 2.8 % (ref 0–8)
ERYTHROCYTE [DISTWIDTH] IN BLOOD BY AUTOMATED COUNT: 12.3 % (ref 11.5–14.5)
EST. GFR  (AFRICAN AMERICAN): >60 ML/MIN/1.73 M^2
EST. GFR  (NON AFRICAN AMERICAN): >60 ML/MIN/1.73 M^2
FRACTIONAL SHORTENING: 35 % (ref 28–44)
GLUCOSE SERPL-MCNC: 112 MG/DL (ref 70–110)
HCT VFR BLD AUTO: 43.8 % (ref 40–54)
HGB BLD-MCNC: 14.8 G/DL (ref 14–18)
IMM GRANULOCYTES # BLD AUTO: 0.02 K/UL (ref 0–0.04)
IMM GRANULOCYTES NFR BLD AUTO: 0.3 % (ref 0–0.5)
INTERVENTRICULAR SEPTUM: 1.13 CM (ref 0.6–1.1)
LA MAJOR: 4.04 CM
LA MINOR: 2.88 CM
LEFT ATRIUM SIZE: 3.88 CM
LEFT INTERNAL DIMENSION IN SYSTOLE: 2.79 CM (ref 2.1–4)
LEFT VENTRICLE DIASTOLIC VOLUME INDEX: 34.75 ML/M2
LEFT VENTRICLE DIASTOLIC VOLUME: 82.21 ML
LEFT VENTRICLE MASS INDEX: 71 G/M2
LEFT VENTRICLE SYSTOLIC VOLUME INDEX: 12.3 ML/M2
LEFT VENTRICLE SYSTOLIC VOLUME: 29.18 ML
LEFT VENTRICULAR INTERNAL DIMENSION IN DIASTOLE: 4.28 CM (ref 3.5–6)
LEFT VENTRICULAR MASS: 168.1 G
LYMPHOCYTES # BLD AUTO: 1.9 K/UL (ref 1–4.8)
LYMPHOCYTES NFR BLD: 29.3 % (ref 18–48)
MCH RBC QN AUTO: 30.7 PG (ref 27–31)
MCHC RBC AUTO-ENTMCNC: 33.8 G/DL (ref 32–36)
MCV RBC AUTO: 91 FL (ref 82–98)
MONOCYTES # BLD AUTO: 0.5 K/UL (ref 0.3–1)
MONOCYTES NFR BLD: 8 % (ref 4–15)
NEUTROPHILS # BLD AUTO: 3.8 K/UL (ref 1.8–7.7)
NEUTROPHILS NFR BLD: 58.8 % (ref 38–73)
NRBC BLD-RTO: 0 /100 WBC
OHS CV CPX 1 MINUTE RECOVERY HEART RATE: 112 BPM
OHS CV CPX 85 PERCENT MAX PREDICTED HEART RATE MALE: 139
OHS CV CPX ESTIMATED METS: 8
OHS CV CPX MAX PREDICTED HEART RATE: 163
OHS CV CPX PATIENT IS FEMALE: 0
OHS CV CPX PATIENT IS MALE: 1
OHS CV CPX PEAK DIASTOLIC BLOOD PRESSURE: 68 MMHG
OHS CV CPX PEAK HEAR RATE: 144 BPM
OHS CV CPX PEAK RATE PRESSURE PRODUCT: ABNORMAL
OHS CV CPX PEAK SYSTOLIC BLOOD PRESSURE: 149 MMHG
OHS CV CPX PERCENT MAX PREDICTED HEART RATE ACHIEVED: 88
OHS CV CPX RATE PRESSURE PRODUCT PRESENTING: ABNORMAL
PLATELET # BLD AUTO: 263 K/UL (ref 150–350)
PMV BLD AUTO: 9.7 FL (ref 9.2–12.9)
POCT GLUCOSE: 339 MG/DL (ref 70–110)
POTASSIUM SERPL-SCNC: 4.3 MMOL/L (ref 3.5–5.1)
PROT SERPL-MCNC: 6.5 G/DL (ref 6–8.4)
RA MAJOR: 3.86 CM
RA WIDTH: 3.4 CM
RBC # BLD AUTO: 4.82 M/UL (ref 4.6–6.2)
RIGHT VENTRICULAR END-DIASTOLIC DIMENSION: 3.28 CM
SODIUM SERPL-SCNC: 140 MMOL/L (ref 136–145)
STRESS ECHO POST EXERCISE DUR MIN: 6 MINUTES
STRESS ECHO POST EXERCISE DUR SEC: 31 SECONDS
SYSTOLIC BLOOD PRESSURE: 124 MMHG
TROPONIN I SERPL DL<=0.01 NG/ML-MCNC: <0.01 NG/ML (ref 0.02–0.5)
WBC # BLD AUTO: 6.52 K/UL (ref 3.9–12.7)

## 2020-02-19 PROCEDURE — 84484 ASSAY OF TROPONIN QUANT: CPT

## 2020-02-19 PROCEDURE — 25000003 PHARM REV CODE 250: Performed by: EMERGENCY MEDICINE

## 2020-02-19 PROCEDURE — 36415 COLL VENOUS BLD VENIPUNCTURE: CPT

## 2020-02-19 PROCEDURE — 99204 OFFICE O/P NEW MOD 45 MIN: CPT | Mod: 25,,, | Performed by: INTERNAL MEDICINE

## 2020-02-19 PROCEDURE — G0378 HOSPITAL OBSERVATION PER HR: HCPCS

## 2020-02-19 PROCEDURE — 25500020 PHARM REV CODE 255: Performed by: INTERNAL MEDICINE

## 2020-02-19 PROCEDURE — 96372 THER/PROPH/DIAG INJ SC/IM: CPT | Performed by: EMERGENCY MEDICINE

## 2020-02-19 PROCEDURE — 99204 PR OFFICE/OUTPT VISIT, NEW, LEVL IV, 45-59 MIN: ICD-10-PCS | Mod: 25,,, | Performed by: INTERNAL MEDICINE

## 2020-02-19 PROCEDURE — 25000003 PHARM REV CODE 250: Performed by: INTERNAL MEDICINE

## 2020-02-19 PROCEDURE — 85025 COMPLETE CBC W/AUTO DIFF WBC: CPT

## 2020-02-19 PROCEDURE — 80053 COMPREHEN METABOLIC PANEL: CPT

## 2020-02-19 RX ADMIN — INSULIN DETEMIR 30 UNITS: 100 INJECTION, SOLUTION SUBCUTANEOUS at 09:02

## 2020-02-19 RX ADMIN — ASPIRIN 325 MG ORAL TABLET 325 MG: 325 PILL ORAL at 09:02

## 2020-02-19 RX ADMIN — SULFUR HEXAFLUORIDE 5 ML: KIT at 08:02

## 2020-02-19 RX ADMIN — PANTOPRAZOLE SODIUM 40 MG: 40 TABLET, DELAYED RELEASE ORAL at 09:02

## 2020-02-19 RX ADMIN — DULOXETINE 60 MG: 30 CAPSULE, DELAYED RELEASE ORAL at 09:02

## 2020-02-19 RX ADMIN — PREGABALIN 100 MG: 100 CAPSULE ORAL at 09:02

## 2020-02-19 RX ADMIN — ATORVASTATIN CALCIUM 20 MG: 10 TABLET, FILM COATED ORAL at 09:02

## 2020-02-19 RX ADMIN — HYDROCODONE BITARTRATE AND ACETAMINOPHEN 1 TABLET: 5; 325 TABLET ORAL at 04:02

## 2020-02-19 NOTE — NURSING
2 patient identifier name and date of brit confirmed as stated by patient and matched with armband. Informed consent obtained. Dr Vicente at bedside in cardiology.

## 2020-02-19 NOTE — HOSPITAL COURSE
2.19  Admitted with cp  Mi ruled out  Seen by cardiology  Stress echo neg  Will d/c home  F/u in a week

## 2020-02-19 NOTE — NURSING
Stress echo complete. Vs returned to baseline. Escorted patient to [address] with ticket to ride Nurse signed on return

## 2020-02-19 NOTE — DISCHARGE SUMMARY
[facility] Medicine  Discharge Summary      Patient Name: Billy Sears  MRN: [id number]  Admission Date: 2/17/2020  Hospital Length of Stay: 0 days  Discharge Date and Time: No discharge date for patient encounter.  Attending Physician: Andrew Brownlee III, MD   Discharging Provider: Andrew Brownlee III, MD  Primary Care Provider: Andrew Brownlee III, MD      HPI:   2.17  Admitted with cp ;and varela    * No surgery found *      Hospital Course:   2.19  Admitted with cp  Mi ruled out  Seen by cardiology  Stress echo neg  Will d/c home  F/u in a week     Consults:   Consults (From admission, onward)        Status Ordering Provider     Inpatient consult to Cardiology  Once     Provider:  Quincy Vicente MD    Completed ANDREW BROWNLEE III          * Atypical chest pain    2.17  Admit  R/o mi  Stress test    2.18  Still having chest tightness  Multiple risk factors for cad  Apparently unable to perform stress test today  Will attempt tomorrow  Cardiology consult    2.19  Admitted with cp  Mi ruled out  Seen by cardiology  Stress echo neg  Will d/c home  F/u in a week      Final Active Diagnoses:    Diagnosis Date Noted POA    PRINCIPAL PROBLEM:  Atypical chest pain [R07.89] 02/18/2020 Yes    Smoker, half ppd, started age 19, 20 py [F17.200] 02/19/2020 Yes    Cardiovascular event risk, ASCVD 10-yr 14.4%, on 20 mg of atorvastatin, 2019 [Z91.89] 02/19/2020 Yes    Family history of premature CAD [Z82.49] 02/19/2020 Not Applicable    Class 1 obesity due to excess calories with serious comorbidity and body mass index (BMI) of 33.0 to 33.9 in adult [E66.09, Z68.33] 02/19/2020 Not Applicable    Abdominal obesity [E65] 02/19/2020 Yes    Diuretic-induced hypokalemia [E87.6, T50.2X5A] 02/19/2020 Yes    Unstable angina [I20.0] 02/17/2020 Yes    Hemoglobin A1c between 7.0% and 9.0% [R73.09] 07/31/2018 Yes    Type 1 diabetes mellitus with hyperglycemia [E10.65]  Yes    HTN (hypertension) [I10]   Yes      Problems Resolved During this Admission:       Discharged Condition: good    Disposition:     Follow Up:  Follow-up Information     JESS Blanco. Go on 2/20/2020.    Specialty:  Family Medicine  Why:  appointment time: 9:00am Thursday Feb 20th for hospital follow up  Contact information:  [address]  [place] [address]  [phone]             Andrew Brownlee III, MD In 1 week.    Specialties:  Internal Medicine, Cardiology  Contact information:  [address]  [facility] [place] [address] [phone]                 Patient Instructions:   No discharge procedures on file.    Significant Diagnostic Studies: Cardiac Graphics: Stress Test: neg    Pending Diagnostic Studies:     None         Medications:  Reconciled Home Medications:      Medication List      CONTINUE taking these medications    albuterol 90 mcg/actuation inhaler  Commonly known as:  Ventolin HFA  Inhale 2 puffs into the lungs every 6 (six) hours as needed for Wheezing. Rescue     albuterol-ipratropium 2.5 mg-0.5 mg/3 mL nebulizer solution  Commonly known as:  DUO-NEB  Take 3 mLs by nebulization every 4 to 6 hours as needed for Wheezing.     aspirin 325 MG EC tablet  Commonly known as:  ECOTRIN  Take 325 mg by mouth once daily.     atorvastatin 20 MG tablet  Commonly known as:  LIPITOR  TAKE 1 TABLET BY MOUTH ONCE DAILY     blood sugar diagnostic Strp  Commonly known as:  Accu-Chek Active Test  1 strip by Misc.(Non-Drug; Combo Route) route 5 (five) times daily.     blood-glucose meter kit  Use as instructed     DULoxetine 60 MG capsule  Commonly known as:  CYMBALTA  Take 1 capsule (60 mg total) by mouth once daily.     insulin glargine U-300 conc 300 unit/mL (3 mL) Inpn  Commonly known as:  Toujeo Max U-300 SoloStar  Inject 66 Units into the skin once daily.     insulin lispro 100 unit/mL pen  Commonly known as:  HumaLOG KwikPen Insulin  Inject 15 Units into the skin 3 (three) times daily with  "meals. Sliding scale max 30 units daily     insulin syringe-needle U-100 0.5 mL 30 gauge x 1/2" Syrg  Commonly known as:  BD Insulin Syringe Ultra-Fine  Inject 1 each into the skin 4 (four) times daily.     lisinopril-hydrochlorothiazide 20-12.5 mg per tablet  Commonly known as:  PRINZIDE,ZESTORETIC  Take 2 tablets by mouth once daily.     omeprazole 40 MG capsule  Commonly known as:  PRILOSEC  Take 1 capsule (40 mg total) by mouth once daily.     * pen needle, diabetic 30 gauge x 1/3" Ndle  1 each by Misc.(Non-Drug; Combo Route) route as directed.     * pen needle, diabetic 32 gauge x 1/6" Ndle  Commonly known as:  NovoFine Plus  Inject 1 each into the skin once daily.     pregabalin 100 MG capsule  Commonly known as:  LYRICA  Take 1 capsule (100 mg total) by mouth 3 (three) times daily.         * This list has 2 medication(s) that are the same as other medications prescribed for you. Read the directions carefully, and ask your doctor or other care provider to review them with you.                Indwelling Lines/Drains at time of discharge:   Lines/Drains/Airways     None                 Time spent on the discharge of patient: 30 minutes  Patient was seen and examined on the date of discharge and determined to be suitable for discharge.         Andrew Brownlee III, MD  Department of Hospital Medicine  [facility]  "

## 2020-02-19 NOTE — ASSESSMENT & PLAN NOTE
2.17  Admit  R/o mi  Stress test    2.18  Still having chest tightness  Multiple risk factors for cad  Apparently unable to perform stress test today  Will attempt tomorrow  Cardiology consult    2.19  Admitted with cp  Mi ruled out  Seen by cardiology  Stress echo neg  Will d/c home  F/u in a week

## 2020-02-19 NOTE — NURSING
D/C HOME VIA POV. CONDITION GOOD. D/C INSTRUCTIONS GIVEN AND VERBALIZED UNDERSTANDING. NO DISTRESS NTOED OR VERBALIZED AT TIME OF D/C.

## 2020-02-19 NOTE — DISCHARGE INSTRUCTIONS

## 2020-02-19 NOTE — CONSULTS
"[facility]  Cardiology  Consult Note    Patient Name: Billy Sears  MRN: [id number]  Admission Date: 2/17/2020  Hospital Length of Stay: 0 days  Code Status: Full Code   Attending Provider: Dr. Brownlee  Consulting Provider: Quincy Vicente MD  Primary Care Physician: Andrew Brownlee III, MD  Principal Problem:Atypical chest pain    Patient information was obtained from patient, past medical records, NP and ER records.   Patient is a new patient to me.    Consults  Subjective:     Chief Complaint:  Pt c/o weakness, chest pain, numbness and tingling in left arm, SOB, nausea, sweating.       HPI: WM with long history of atypical CP resulting in angiogram at [facility] in 2008, reported no blockages. Return with progressive left sided chest discomfort, can involve the entire left chest and can last for day, latest over 10 days. No clear inciting factors and seems to intensify with physical exertion. 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 Admitted for wanting evaluation and stress test. Report premature family history for CAD with mother requiring CABG at age 62. Have a number of significant ASCVD risk factors, 10-yr event risk of 14.4% on 20 mg of atorvastatin. Labs so for shows negative marker, normal ECG and patient with continual CP, only help some by 2 regular ASA after 3-4 minutes, been on this regiment for the last 2 years. LDL is excellent at 52 on statin but continue to smoke half pack daily.    Ms Lisa Y. Cooksey, NP noted "Has shortness of breath with minimal activity, chest "tightness", left neck pain, left arm pain, "clammy sweats and nausea"  Symptoms have been present intermittently for about two years  Over past two weeks has noticed increased frequency and severity of symptoms  States that after taking a shower this morning he had to sit on the side of tub to dry off due to sob and pain  "I am not even carrying my cup because it " "just feels too heavy to hold"  Denies syncope, near syncope  Does have associated dizziness  No tinnitus, jaw pain, thoracic back pain, or lower extremity edema, but wearing compression hose  Reports, "I'm scared"    Prefers to go to [place], as that is where he lives, but recommend that he not travel that far without further evaluation  Understands and agrees"        Past Medical History:   Diagnosis Date    Anxiety     Chronic pain     Depression     Diabetes mellitus type I     DM (diabetes mellitus)     HTN (hypertension)     Hyperlipemia     Obesity        Past Surgical History:   Procedure Laterality Date    c-spine  2013    CERVICAL SPINE SURGERY  2013    [place]  2005    [place]    LUMBAR SPINE SURGERY  2012    SPINE SURGERY      Cervical       Review of patient's allergies indicates:   Allergen Reactions    Demerol [meperidine] Nausea And Vomiting       No current facility-administered medications on file prior to encounter.      Current Outpatient Medications on File Prior to Encounter   Medication Sig    albuterol (VENTOLIN HFA) 90 mcg/actuation inhaler Inhale 2 puffs into the lungs every 6 (six) hours as needed for Wheezing. Rescue    albuterol-ipratropium (DUO-NEB) 2.5 mg-0.5 mg/3 mL nebulizer solution Take 3 mLs by nebulization every 4 to 6 hours as needed for Wheezing.    aspirin (ECOTRIN) 325 MG EC tablet Take 325 mg by mouth once daily.    atorvastatin (LIPITOR) 20 MG tablet TAKE 1 TABLET BY MOUTH ONCE DAILY    blood sugar diagnostic (ACCU-CHEK ACTIVE TEST) Strp 1 strip by Misc.(Non-Drug; Combo Route) route 5 (five) times daily.    DULoxetine (CYMBALTA) 60 MG capsule Take 1 capsule (60 mg total) by mouth once daily.    insulin glargine U-300 conc (TOUJEO MAX U-300 SOLOSTAR) 300 unit/mL (3 mL) InPn Inject 66 Units into the skin once daily.    insulin lispro (HUMALOG KWIKPEN INSULIN) 100 unit/mL pen Inject 15 Units into the skin 3 (three) times daily with meals. " "Sliding scale max 30 units daily    insulin needles, disposable, 30 x 1/3 " Ndle 1 each by Misc.(Non-Drug; Combo Route) route as directed.    insulin syringe-needle U-100 (BD INSULIN SYRINGE ULTRA-FINE) 1/2 mL 30 gauge x 1/2" Syrg Inject 1 each into the skin 4 (four) times daily.    lisinopril-hydrochlorothiazide (PRINZIDE,ZESTORETIC) 20-12.5 mg per tablet Take 2 tablets by mouth once daily.    omeprazole (PRILOSEC) 40 MG capsule Take 1 capsule (40 mg total) by mouth once daily.    pen needle, diabetic (NOVOFINE PLUS) 32 gauge x 1/6" Ndle Inject 1 each into the skin once daily.    pregabalin (LYRICA) 100 MG capsule Take 1 capsule (100 mg total) by mouth 3 (three) times daily.    blood-glucose meter kit Use as instructed     Family History     Problem Relation (Age of Onset)    Cancer Mother    Diabetes Father    Heart failure Mother        Tobacco Use    Smoking status: Current Some Day Smoker     Packs/day: 1.00     Types: Cigarettes    Smokeless tobacco: Never Used   Substance and Sexual Activity    Alcohol use: No    Drug use: No    Sexual activity: Yes     ROS   Constitutional: Positive for diaphoresis and fatigue. Negative for appetite change, chills and fever.   HENT: Negative.    Eyes: Negative.    Respiratory: Positive for shortness of breath. Negative for cough, choking, chest tightness and wheezing.    Cardiovascular: Positive for chest pain. Negative for palpitations and leg swelling.   Gastrointestinal: Positive for nausea. Negative for abdominal pain, constipation, diarrhea and vomiting.   Endocrine: Negative for polydipsia, polyphagia and polyuria.   Genitourinary: Negative.    Musculoskeletal: Positive for arthralgias (left arm), myalgias and neck pain (left neck). Negative for back pain.   Skin: Negative.    Neurological: Positive for dizziness and numbness. Negative for tremors, syncope and headaches.   Psychiatric/Behavioral: Negative for self-injury and suicidal ideas. The patient is " "nervous/anxious.    All other systems reviewed and are negative.    Objective:     Vital Signs (Most Recent):  Temp: 96.8 °F (36 °C) (02/19/20 0721)  Pulse: 88 (02/19/20 0826)  Resp: 18 (02/19/20 0721)  BP: 124/80 (02/19/20 0826)  SpO2: 98 % (02/19/20 0721) Vital Signs (24h Range):  Temp:  [96.8 °F (36 °C)-98.2 °F (36.8 °C)] 96.8 °F (36 °C)  Pulse:  [60-88] 88  Resp:  [12-31] 18  SpO2:  [95 %-99 %] 98 %  BP: (100-143)/(55-80) 124/80     Weight: 113.8 kg (250 lb 14.1 oz)  Body mass index is 33.1 kg/m².    SpO2: 98 %  O2 Device (Oxygen Therapy): room air      Intake/Output Summary (Last 24 hours) at 2/19/2020 0857  Last data filed at 2/18/2020 1800  Gross per 24 hour   Intake 980 ml   Output 500 ml   Net 480 ml       Lines/Drains/Airways     Peripheral Intravenous Line                 Peripheral IV - Single Lumen 02/17/20 1526 18 G Right Antecubital 1 day                Physical Exam  General appearance: alert, appears stated age, cooperative and no distress  Head: Normocephalic, without obvious abnormality, atraumatic  Eyes:  conjunctivae/corneas clear. PERRL, EOM's intact. Fundi benign on recent fundoscopy  Neck: no adenopathy, no carotid bruit, no JVD, supple, symmetrical, trachea midline and thyroid not enlarged, symmetric, no tenderness/mass/nodules  Lungs: Diminished breath sounds and prolong expiration.   bilaterally  Chest wall: no tenderness  Heart: regular rate and rhythm, distant heart tones, no murmur, click, rub or gallop and normal apical impulse  Abdomen: soft, non-tender; bowel sounds normal; no masses,  no organomegaly, waist circumference 45"  Extremities: extremities normal, atraumatic, no cyanosis or edema  Pulses: 1+ in LE rest 2+ and symmetric    Significant Labs:   ABG: No results for input(s): PH, PCO2, HCO3, POCSATURATED, BE in the last 48 hours., CMP, A1C 8.6%, normal TSH, T4    Recent Labs   Lab 02/17/20  1541 02/18/20  0430 02/19/20  0551   * 139 140   K 3.1* 3.7 4.3    105 104 "   CO2 24 28 29   * 229* 112*   BUN 12 10 13   CREATININE 1.1 0.9 1.0   CALCIUM 8.8 8.9 9.1   PROT 7.4 6.5 6.5   ALBUMIN 4.1 3.4* 3.6   BILITOT 0.7 0.6 0.4   ALKPHOS 64 57 61   AST 14 14 12   ALT 21 20 23   ANIONGAP 11 6* 7*   ESTGFRAFRICA >60.0 >60.0 >60.0   EGFRNONAA >60.0 >60.0 >60.0   , CBC   Recent Labs   Lab 02/17/20  1541 02/18/20  0430 02/19/20  0551   WBC 12.22 7.00 6.52   HGB 15.1 14.4 14.8   HCT 43.8 41.7 43.8    256 263   , Lipid Panel No results for input(s): CHOL, HDL, LDLCALC, TRIG, CHOLHDL in the last 48 hours., Troponin   Recent Labs   Lab 02/17/20  1904 02/18/20  0053 02/19/20  0551   TROPONINI <0.01* <0.01* <0.01*    and All pertinent lab results from the last 24 hours have been reviewed.    Significant Imaging: X-Ray: CXR: Negative report on chart.  Last Echo 8/2017, no official readings, measurements are normal, LVEF 65%, no evidence for hypertensive heart disease.    Assessment and Plan:     Active Diagnoses:    Diagnosis Date Noted POA    PRINCIPAL PROBLEM:  Atypical chest pain [R07.89] 02/18/2020 Unknown    Smoker, half ppd, started age 19, 20 py [F17.200] 02/19/2020 Unknown    Cardiovascular event risk, ASCVD 10-yr 14.4%, on 20 mg of atorvastatin, 2019 [Z91.89] 02/19/2020 Unknown    Family history of premature CAD [Z82.49] 02/19/2020 Not Applicable    Class 1 obesity due to excess calories with serious comorbidity and body mass index (BMI) of 33.0 to 33.9 in adult [E66.09, Z68.33] 02/19/2020 Not Applicable    Abdominal obesity [E65] 02/19/2020 Unknown    Diuretic-induced hypokalemia [E87.6, T50.2X5A] 02/19/2020 Unknown    Unstable angina [I20.0] 02/17/2020 Yes    Hemoglobin A1c between 7.0% and 9.0% [R73.09] 07/31/2018 Yes    Type 1 diabetes mellitus with hyperglycemia [E10.65]  Yes    HTN (hypertension) [I10]  Yes      Problems Resolved During this Admission:       VTE Risk Mitigation (From admission, onward)         Ordered     IP VTE HIGH RISK PATIENT  Once       02/17/20 1847     Place MICHELLE hose  Until discontinued      02/17/20 1847              Have intermediate ASCVD event risk, stress test ordered and pending  Need Recommend healthy living: no nicotine, moderate alcohol, healthy diet and regular exercise aiming for fitness, and weight control.  Long standing atypical CP, likely non-cardiac, smoking cessation would be very helpful.  Recommend at least annual cardiovascular evaluation in view of patient's significant risk factors.    Thank you for your consult. I will sign off. Please contact us if you have any additional questions.    Quincy Vicente MD  Cardiology   [facility]    Greater than 50% of the time was spent in counseling and coordination of care. The above assessment and plan have been discussed at length. Referring physician's note reviewed. Labs and procedure over the last 6 months reviewed. Problem List updated. Asked to bring in all active medications / pills bottles with next visit.

## 2020-02-19 NOTE — PLAN OF CARE
02/19/20 1108   Final Note   Assessment Type Final Discharge Note   Anticipated Discharge Disposition Home   What phone number can be called within the next 1-3 days to see how you are doing after discharge? [phone]   Hospital Follow Up  Appt(s) scheduled? Yes   Discharge plans and expectations educations in teach back method with documentation complete? Yes   Patient ambulating in room awaiting for discharge orders to be put in computer. Verbal & written follow up appointment provided to patient. Demonstrated understanding by verbal feedback. Denies any other needs at this time.  States he's hoping to go back to work at the beginning of the week.

## 2020-02-19 NOTE — PLAN OF CARE
Problem: Adult Inpatient Plan of Care  Goal: Plan of Care Review  Outcome: Ongoing, Progressing  Goal: Patient-Specific Goal (Individualization)  Outcome: Ongoing, Progressing  Goal: Absence of Hospital-Acquired Illness or Injury  Outcome: Ongoing, Progressing  Goal: Optimal Comfort and Wellbeing  Outcome: Ongoing, Progressing  Goal: Readiness for Transition of Care  Outcome: Ongoing, Progressing  Goal: Rounds/Family Conference  Outcome: Ongoing, Progressing     Problem: Diabetes Comorbidity  Goal: Blood Glucose Level Within Desired Range  Outcome: Ongoing, Progressing     Problem: Fall Injury Risk  Goal: Absence of Fall and Fall-Related Injury  Outcome: Ongoing, Progressing

## 2020-02-20 ENCOUNTER — HOSPITAL ENCOUNTER (OUTPATIENT)
Dept: RADIOLOGY | Facility: HOSPITAL | Age: 60
Discharge: HOME | End: 2020-02-20
Attending: NURSE PRACTITIONER
Payer: COMMERCIAL

## 2020-02-20 DIAGNOSIS — F17.200 CURRENT SMOKER: ICD-10-CM

## 2020-02-20 DIAGNOSIS — M54.2 NECK PAIN: ICD-10-CM

## 2020-02-20 DIAGNOSIS — R07.89 CHEST TIGHTNESS: ICD-10-CM

## 2020-02-20 DIAGNOSIS — R20.2 PARESTHESIA: ICD-10-CM

## 2020-02-20 DIAGNOSIS — E10.65 TYPE 1 DIABETES MELLITUS WITH HYPERGLYCEMIA: ICD-10-CM

## 2020-02-20 DIAGNOSIS — Z82.49 FAMILY HISTORY OF PREMATURE CAD: ICD-10-CM

## 2020-02-20 DIAGNOSIS — I10 ESSENTIAL HYPERTENSION: ICD-10-CM

## 2020-02-20 DIAGNOSIS — E78.5 HYPERLIPIDEMIA WITH TARGET LOW DENSITY LIPOPROTEIN (LDL) CHOLESTEROL LESS THAN 70 MG/DL: ICD-10-CM

## 2020-02-20 DIAGNOSIS — R06.00 DYSPNEA, UNSPECIFIED TYPE: ICD-10-CM

## 2020-02-20 DIAGNOSIS — I20.0 UNSTABLE ANGINA: ICD-10-CM

## 2020-02-20 PROCEDURE — 93880 US CAROTID BILATERAL: ICD-10-PCS | Mod: 26,,, | Performed by: RADIOLOGY

## 2020-02-20 PROCEDURE — 93880 EXTRACRANIAL BILAT STUDY: CPT | Mod: 26,,, | Performed by: RADIOLOGY

## 2020-02-20 PROCEDURE — 25500020 PHARM REV CODE 255: Performed by: NURSE PRACTITIONER

## 2020-02-20 PROCEDURE — 93880 EXTRACRANIAL BILAT STUDY: CPT | Mod: TC,PN

## 2020-02-20 RX ADMIN — IOHEXOL 100 ML: 350 INJECTION, SOLUTION INTRAVENOUS at 11:02

## 2020-02-21 PROBLEM — I65.21 STENOSIS OF RIGHT CAROTID ARTERY: Status: ACTIVE | Noted: 2020-02-21

## 2020-02-21 PROBLEM — R91.8 MULTIPLE LUNG NODULES: Status: ACTIVE | Noted: 2020-02-21

## 2020-03-13 ENCOUNTER — OFFICE VISIT (OUTPATIENT)
Dept: UROLOGY | Facility: CLINIC | Age: 60
End: 2020-03-13
Payer: COMMERCIAL

## 2020-03-13 VITALS
HEIGHT: 73 IN | SYSTOLIC BLOOD PRESSURE: 130 MMHG | HEART RATE: 91 BPM | DIASTOLIC BLOOD PRESSURE: 74 MMHG | WEIGHT: 252 LBS | BODY MASS INDEX: 33.4 KG/M2

## 2020-03-13 DIAGNOSIS — A63.0 GENITAL WARTS: ICD-10-CM

## 2020-03-13 DIAGNOSIS — N48.21 PENILE ABSCESS: Primary | ICD-10-CM

## 2020-03-13 LAB
BILIRUB SERPL-MCNC: ABNORMAL MG/DL
BLOOD URINE, POC: ABNORMAL
COLOR, POC UA: ABNORMAL
GLUCOSE UR QL STRIP: ABNORMAL
KETONES UR QL STRIP: ABNORMAL
LEUKOCYTE ESTERASE URINE, POC: ABNORMAL
NITRITE, POC UA: ABNORMAL
PH, POC UA: 6
PROTEIN, POC: 50
SPECIFIC GRAVITY, POC UA: 1005
UROBILINOGEN, POC UA: ABNORMAL

## 2020-03-13 PROCEDURE — 99204 PR OFFICE/OUTPT VISIT, NEW, LEVL IV, 45-59 MIN: ICD-10-PCS | Mod: 25,S$GLB,, | Performed by: UROLOGY

## 2020-03-13 PROCEDURE — 81002 URINALYSIS NONAUTO W/O SCOPE: CPT | Mod: S$GLB,,, | Performed by: UROLOGY

## 2020-03-13 PROCEDURE — 81002 POCT URINE DIPSTICK WITHOUT MICROSCOPE: ICD-10-PCS | Mod: S$GLB,,, | Performed by: UROLOGY

## 2020-03-13 PROCEDURE — 99204 OFFICE O/P NEW MOD 45 MIN: CPT | Mod: 25,S$GLB,, | Performed by: UROLOGY

## 2020-03-13 PROCEDURE — 99999 PR PBB SHADOW E&M-EST. PATIENT-LVL III: ICD-10-PCS | Mod: PBBFAC,,, | Performed by: UROLOGY

## 2020-03-13 PROCEDURE — 99999 PR PBB SHADOW E&M-EST. PATIENT-LVL III: CPT | Mod: PBBFAC,,, | Performed by: UROLOGY

## 2020-03-13 NOTE — LETTER
March 13, 2020      JOSUÉ Brownlee MD  [address] [facility] [place] [address]           [place] [facility]  [address] [place] [address] [address]  [facility] [address]  Phone: [phone]  Fax: [phone]          Patient: Billy Sears   MR Number: [id number]   YOB: 1960   Date of Visit: 3/13/2020       Dear Dr. JOSUÉ Brownlee:    Thank you for referring Billy Sears to me for evaluation. Attached you will find relevant portions of my assessment and plan of care.    If you have questions, please do not hesitate to call me. I look forward to following Billy Sears along with you.    Sincerely,    Bryan Kwong Jr., MD    Enclosure  CC:  No Recipients    If you would like to receive this communication electronically, please contact [email or web address] or [phone] to request more information on [facility] access.    For providers and/or their staff who would like to refer a patient to [facility], please contact us through our one-stop-shop provider referral line, [facility] , at [phone].    If you feel you have received this communication in error or would no longer like to receive these types of communications, please e-mail [email or web address]

## 2020-03-13 NOTE — PROGRESS NOTES
[facility] New Patient/H&P:    Billy Sears is a 59 y.o. male who presents for penile abscess.    Patient referred by his PCP Dr. Brownlee for evaluation of a penile abscess that was managed in the emergency department at [facility].    He states that he developed a bump on the penis on 3/2/20 that was progressively worsening over the week. He presented to the emergency department at [facility] in [place] (lives in [place]).     He states that he underwent an incision and drainage of the abscess with packing. He presented 1 week later to [facility] for the packing removal.     Patient has been on Clindamycin 300 MG.     No urinary complaints.     Of note, he has a history of genital warts.       A1C  8.6  2/17/20    PSA  0.5  8/29/19      Past Medical History:   Diagnosis Date    Anxiety     Chronic pain     Depression     Diabetes mellitus type I     DM (diabetes mellitus)     HTN (hypertension)     Hyperlipemia     Obesity        Past Surgical History:   Procedure Laterality Date    c-spine  2013    CERVICAL SPINE SURGERY  2013    [place]OSCOPY  2005    [place]    LUMBAR SPINE SURGERY  2012    SPINE SURGERY      Cervical       Family History   Problem Relation Age of Onset    Diabetes Father     Cancer Mother         colon    Heart failure Mother        Social History     Socioeconomic History    Marital status: Single     Spouse name: Not on file    Number of children: Not on file    Years of education: Not on file    Highest education level: Not on file   Occupational History    Not on file   Social Needs    Financial resource strain: Not on file    Food insecurity:     Worry: Not on file     Inability: Not on file    Transportation needs:     Medical: Not on file     Non-medical: Not on file   Tobacco Use    Smoking status: Current Some Day Smoker     Packs/day: 1.00     Types: Cigarettes    Smokeless tobacco: Never Used   Substance and Sexual  "Activity    Alcohol use: No    Drug use: No    Sexual activity: Yes   Lifestyle    Physical activity:     Days per week: Not on file     Minutes per session: Not on file    Stress: Not on file   Relationships    Social connections:     Talks on phone: Not on file     Gets together: Not on file     Attends Yazidism service: Not on file     Active member of club or organization: Not on file     Attends meetings of clubs or organizations: Not on file     Relationship status: Not on file   Other Topics Concern    Not on file   Social History Narrative    Not on file       Review of patient's allergies indicates:   Allergen Reactions    Demerol [meperidine] Nausea And Vomiting       Medications Reviewed: see MAR    ROS:    Constitutional: denies fevers, chills, night sweats, fatigue, malaise  Respiratory: negative for cough, shortness of breath, wheezing, dyspnea.  Cardiovascular: - for high blood pressure, negative for chest pain, varicose veins, ankle swelling, palpitations, syncope.  GI: negative for abdominal pain, heartburn, indigestion, nausea, vomiting, constipation, diarrhea, blood in stool.   Urology: as noted above in HPI  Endocrinology: negative for cold intolerance, excessive thirst, not feeling tired/sluggish, no heat intolerance.   Hematology/Lymph: negative for easy bleeding, easy bruising, swollen glands.  Musculoskeletal: negative for back pain, joint pain, joint swelling, neck pain.  Allergy-Immunology: negative for seasonal allergies, negative for unusual infections.   Skin: negative for boils, breast lumps, hives, itching, rash.   Neurology: negative for, dizziness, headache, tingling/numbness, tremors.   Psych: satisfied with life; negative for, anxiety, depression, suicidal thoughts.     PHYSICAL EXAM:    Vitals:    03/13/20 0900   BP: 130/74   Pulse: 91     Body mass index is 33.25 kg/m². Weight: 114.3 kg (251 lb 15.8 oz) Height: 6' 1" (185.4 cm)       General: Alert, cooperative, no " distress, appears stated age  Head: Normocephalic, without obvious abnormality, atraumatic  Neck: no masses, no thyromegaly, no lymphadenopathy  Eyes: PERRL, conjunctiva/corneas clear  Lungs: Respirations unlabored, normal effort, no accessory muscle use  CV: Warm and well perfused extremities  Abdomen: Soft, non-tender, no CVA tenderness, no hepatosplenomegaly, no hernia  Penis: Circumcised, 1 - 2 cm lesion healing well with no tenderness or erythema on the ventral shaft of penis  Scrotum: 0.5 cm genital wart on left anterior scrotal wall  Extremities: Extremities normal, atraumatic, no cyanosis or edema  Skin: Normal color, texture, and turgor, no rashes or lesions  Psych: Appropriate, well oriented, normal affect, normal mood  Neuro: Non-focal        LABS:    Recent Results (from the past 336 hour(s))   POCT URINE DIPSTICK WITHOUT MICROSCOPE    Collection Time: 03/13/20  9:04 AM   Result Value Ref Range    Color, UA CLR     Spec Grav UA 1,005     pH, UA 6     WBC, UA NEG     Nitrite, UA NEG     Protein 50     Glucose, UA NEG     Ketones, UA NEG     Urobilinogen, UA NEG     Bilirubin NEG     Blood, UA NEG        IMAGING:    No recent urologic studies      Assessment/Diagnosis:    1. Penile abscess  POCT URINE DIPSTICK WITHOUT MICROSCOPE   2. Genital warts         Plans:    - I spent 45 minutes with the patient; more than 50% was in counseling about the disease process and methods of treatment. Explained to patient that his abscess is healing well and does not require any additional intervention at this time.  - Continue Clindamycin 300 MG and local wound care.   - Patient interested in ablation of his left anterior scrotal wall genital wart. RTC next available for fulguration.

## 2020-03-19 ENCOUNTER — TELEPHONE (OUTPATIENT)
Dept: UROLOGY | Facility: CLINIC | Age: 60
End: 2020-03-19

## 2020-03-19 NOTE — TELEPHONE ENCOUNTER
----- Message from Lorna Scales sent at 3/19/2020  1:38 PM CDT -----  Type: Needs Medical Advice    Who Called:  patient  Best Call Back Number: [phone]  Additional Information: please give call back to schedule procedure for removing wart. Would like to have it scheduled between 04/20/2020 -04/22/2020

## 2020-03-19 NOTE — TELEPHONE ENCOUNTER
Called and spoke to patient. Patient want to schedule procedure in clinic for 4/22. Informed patient that he geno receive a call back once able to schedule.

## 2020-03-20 NOTE — TELEPHONE ENCOUNTER
Called patient to inform that procedure will have to done in May or June due to COVID-19. No answer. LMOM to give the office a call back.

## 2020-05-20 ENCOUNTER — TELEPHONE (OUTPATIENT)
Dept: UROLOGY | Facility: CLINIC | Age: 60
End: 2020-05-20

## 2020-05-20 NOTE — TELEPHONE ENCOUNTER
Called and spoke to patient regarding scheduling procedure for Penile Abscess removal in clinic. Informed patient that the procedure would have to be done on Thursdays at 8:30. Patient stated that June 25th or July 2nd would be ideal. Will call patient back to confirm and schedule.    Please Advise.

## 2020-05-21 NOTE — TELEPHONE ENCOUNTER
Called patient to schedule procedure in clinic for June 25th. No answer. LMOM to give the office a call back. Spoke to RN lead to open procedure slot in clinic.

## 2021-07-01 ENCOUNTER — PATIENT MESSAGE (OUTPATIENT)
Dept: ADMINISTRATIVE | Facility: OTHER | Age: 61
End: 2021-07-01